# Patient Record
Sex: MALE | Race: WHITE | NOT HISPANIC OR LATINO | URBAN - METROPOLITAN AREA
[De-identification: names, ages, dates, MRNs, and addresses within clinical notes are randomized per-mention and may not be internally consistent; named-entity substitution may affect disease eponyms.]

---

## 2017-10-30 ENCOUNTER — OUTPATIENT (OUTPATIENT)
Dept: OUTPATIENT SERVICES | Facility: HOSPITAL | Age: 72
LOS: 1 days | Discharge: HOME | End: 2017-10-30

## 2017-10-30 DIAGNOSIS — C61 MALIGNANT NEOPLASM OF PROSTATE: ICD-10-CM

## 2018-04-06 ENCOUNTER — OUTPATIENT (OUTPATIENT)
Dept: OUTPATIENT SERVICES | Facility: HOSPITAL | Age: 73
LOS: 1 days | Discharge: HOME | End: 2018-04-06

## 2018-04-06 DIAGNOSIS — R94.31 ABNORMAL ELECTROCARDIOGRAM [ECG] [EKG]: ICD-10-CM

## 2021-05-06 ENCOUNTER — INPATIENT (INPATIENT)
Facility: HOSPITAL | Age: 76
LOS: 1 days | Discharge: HOME | End: 2021-05-08
Attending: STUDENT IN AN ORGANIZED HEALTH CARE EDUCATION/TRAINING PROGRAM | Admitting: STUDENT IN AN ORGANIZED HEALTH CARE EDUCATION/TRAINING PROGRAM
Payer: MEDICARE

## 2021-05-06 ENCOUNTER — TRANSCRIPTION ENCOUNTER (OUTPATIENT)
Age: 76
End: 2021-05-06

## 2021-05-06 VITALS
TEMPERATURE: 97 F | SYSTOLIC BLOOD PRESSURE: 174 MMHG | OXYGEN SATURATION: 99 % | DIASTOLIC BLOOD PRESSURE: 84 MMHG | RESPIRATION RATE: 20 BRPM | HEART RATE: 66 BPM

## 2021-05-06 LAB
ALBUMIN SERPL ELPH-MCNC: 4.6 G/DL — SIGNIFICANT CHANGE UP (ref 3.5–5.2)
ALP SERPL-CCNC: 72 U/L — SIGNIFICANT CHANGE UP (ref 30–115)
ALT FLD-CCNC: 16 U/L — SIGNIFICANT CHANGE UP (ref 0–41)
ANION GAP SERPL CALC-SCNC: 8 MMOL/L — SIGNIFICANT CHANGE UP (ref 7–14)
APPEARANCE UR: CLEAR — SIGNIFICANT CHANGE UP
APTT BLD: 33.4 SEC — SIGNIFICANT CHANGE UP (ref 27–39.2)
AST SERPL-CCNC: 15 U/L — SIGNIFICANT CHANGE UP (ref 0–41)
BASOPHILS # BLD AUTO: 0.04 K/UL — SIGNIFICANT CHANGE UP (ref 0–0.2)
BASOPHILS NFR BLD AUTO: 0.4 % — SIGNIFICANT CHANGE UP (ref 0–1)
BILIRUB SERPL-MCNC: 0.4 MG/DL — SIGNIFICANT CHANGE UP (ref 0.2–1.2)
BILIRUB UR-MCNC: NEGATIVE — SIGNIFICANT CHANGE UP
BUN SERPL-MCNC: 13 MG/DL — SIGNIFICANT CHANGE UP (ref 10–20)
CALCIUM SERPL-MCNC: 9.4 MG/DL — SIGNIFICANT CHANGE UP (ref 8.5–10.1)
CHLORIDE SERPL-SCNC: 104 MMOL/L — SIGNIFICANT CHANGE UP (ref 98–110)
CO2 SERPL-SCNC: 27 MMOL/L — SIGNIFICANT CHANGE UP (ref 17–32)
COLOR SPEC: COLORLESS — SIGNIFICANT CHANGE UP
CREAT SERPL-MCNC: 0.8 MG/DL — SIGNIFICANT CHANGE UP (ref 0.7–1.5)
DIFF PNL FLD: NEGATIVE — SIGNIFICANT CHANGE UP
EOSINOPHIL # BLD AUTO: 0.11 K/UL — SIGNIFICANT CHANGE UP (ref 0–0.7)
EOSINOPHIL NFR BLD AUTO: 1 % — SIGNIFICANT CHANGE UP (ref 0–8)
GLUCOSE SERPL-MCNC: 111 MG/DL — HIGH (ref 70–99)
GLUCOSE UR QL: NEGATIVE — SIGNIFICANT CHANGE UP
HCT VFR BLD CALC: 47.3 % — SIGNIFICANT CHANGE UP (ref 42–52)
HGB BLD-MCNC: 16.1 G/DL — SIGNIFICANT CHANGE UP (ref 14–18)
IMM GRANULOCYTES NFR BLD AUTO: 0.4 % — HIGH (ref 0.1–0.3)
INR BLD: 1.03 RATIO — SIGNIFICANT CHANGE UP (ref 0.65–1.3)
KETONES UR-MCNC: ABNORMAL
LEUKOCYTE ESTERASE UR-ACNC: NEGATIVE — SIGNIFICANT CHANGE UP
LYMPHOCYTES # BLD AUTO: 1.29 K/UL — SIGNIFICANT CHANGE UP (ref 1.2–3.4)
LYMPHOCYTES # BLD AUTO: 11.6 % — LOW (ref 20.5–51.1)
MCHC RBC-ENTMCNC: 29.2 PG — SIGNIFICANT CHANGE UP (ref 27–31)
MCHC RBC-ENTMCNC: 34 G/DL — SIGNIFICANT CHANGE UP (ref 32–37)
MCV RBC AUTO: 85.7 FL — SIGNIFICANT CHANGE UP (ref 80–94)
MONOCYTES # BLD AUTO: 0.67 K/UL — HIGH (ref 0.1–0.6)
MONOCYTES NFR BLD AUTO: 6 % — SIGNIFICANT CHANGE UP (ref 1.7–9.3)
NEUTROPHILS # BLD AUTO: 8.94 K/UL — HIGH (ref 1.4–6.5)
NEUTROPHILS NFR BLD AUTO: 80.6 % — HIGH (ref 42.2–75.2)
NITRITE UR-MCNC: NEGATIVE — SIGNIFICANT CHANGE UP
NRBC # BLD: 0 /100 WBCS — SIGNIFICANT CHANGE UP (ref 0–0)
PH UR: 7.5 — SIGNIFICANT CHANGE UP (ref 5–8)
PLATELET # BLD AUTO: 219 K/UL — SIGNIFICANT CHANGE UP (ref 130–400)
POTASSIUM SERPL-MCNC: 3.8 MMOL/L — SIGNIFICANT CHANGE UP (ref 3.5–5)
POTASSIUM SERPL-SCNC: 3.8 MMOL/L — SIGNIFICANT CHANGE UP (ref 3.5–5)
PROT SERPL-MCNC: 7.3 G/DL — SIGNIFICANT CHANGE UP (ref 6–8)
PROT UR-MCNC: NEGATIVE — SIGNIFICANT CHANGE UP
PROTHROM AB SERPL-ACNC: 11.9 SEC — SIGNIFICANT CHANGE UP (ref 9.95–12.87)
RBC # BLD: 5.52 M/UL — SIGNIFICANT CHANGE UP (ref 4.7–6.1)
RBC # FLD: 13.3 % — SIGNIFICANT CHANGE UP (ref 11.5–14.5)
SARS-COV-2 RNA SPEC QL NAA+PROBE: SIGNIFICANT CHANGE UP
SODIUM SERPL-SCNC: 139 MMOL/L — SIGNIFICANT CHANGE UP (ref 135–146)
SP GR SPEC: 1.01 — SIGNIFICANT CHANGE UP (ref 1.01–1.03)
TROPONIN T SERPL-MCNC: <0.01 NG/ML — SIGNIFICANT CHANGE UP
TROPONIN T SERPL-MCNC: <0.01 NG/ML — SIGNIFICANT CHANGE UP
UROBILINOGEN FLD QL: SIGNIFICANT CHANGE UP
WBC # BLD: 11.09 K/UL — HIGH (ref 4.8–10.8)
WBC # FLD AUTO: 11.09 K/UL — HIGH (ref 4.8–10.8)

## 2021-05-06 PROCEDURE — 70450 CT HEAD/BRAIN W/O DYE: CPT | Mod: 26,MA

## 2021-05-06 PROCEDURE — 99223 1ST HOSP IP/OBS HIGH 75: CPT

## 2021-05-06 PROCEDURE — 99283 EMERGENCY DEPT VISIT LOW MDM: CPT

## 2021-05-06 PROCEDURE — 70496 CT ANGIOGRAPHY HEAD: CPT | Mod: 26

## 2021-05-06 PROCEDURE — 93010 ELECTROCARDIOGRAM REPORT: CPT

## 2021-05-06 PROCEDURE — 99285 EMERGENCY DEPT VISIT HI MDM: CPT

## 2021-05-06 PROCEDURE — 70498 CT ANGIOGRAPHY NECK: CPT | Mod: 26

## 2021-05-06 RX ORDER — ACETAMINOPHEN 500 MG
650 TABLET ORAL EVERY 6 HOURS
Refills: 0 | Status: DISCONTINUED | OUTPATIENT
Start: 2021-05-06 | End: 2021-05-08

## 2021-05-06 RX ORDER — ASPIRIN/CALCIUM CARB/MAGNESIUM 324 MG
81 TABLET ORAL DAILY
Refills: 0 | Status: DISCONTINUED | OUTPATIENT
Start: 2021-05-06 | End: 2021-05-08

## 2021-05-06 RX ORDER — ASPIRIN/CALCIUM CARB/MAGNESIUM 324 MG
81 TABLET ORAL DAILY
Refills: 0 | Status: DISCONTINUED | OUTPATIENT
Start: 2021-05-06 | End: 2021-05-06

## 2021-05-06 RX ORDER — FINASTERIDE 5 MG/1
5 TABLET, FILM COATED ORAL DAILY
Refills: 0 | Status: DISCONTINUED | OUTPATIENT
Start: 2021-05-06 | End: 2021-05-06

## 2021-05-06 RX ORDER — AMLODIPINE BESYLATE 2.5 MG/1
10 TABLET ORAL DAILY
Refills: 0 | Status: DISCONTINUED | OUTPATIENT
Start: 2021-05-07 | End: 2021-05-08

## 2021-05-06 RX ORDER — FINASTERIDE 5 MG/1
1 TABLET, FILM COATED ORAL
Qty: 0 | Refills: 0 | DISCHARGE

## 2021-05-06 RX ORDER — AMLODIPINE BESYLATE 2.5 MG/1
1 TABLET ORAL
Qty: 0 | Refills: 0 | DISCHARGE

## 2021-05-06 RX ORDER — FINASTERIDE 5 MG/1
5 TABLET, FILM COATED ORAL DAILY
Refills: 0 | Status: DISCONTINUED | OUTPATIENT
Start: 2021-05-06 | End: 2021-05-08

## 2021-05-06 RX ORDER — ASPIRIN/CALCIUM CARB/MAGNESIUM 324 MG
1 TABLET ORAL
Qty: 0 | Refills: 0 | DISCHARGE

## 2021-05-06 RX ORDER — ENOXAPARIN SODIUM 100 MG/ML
40 INJECTION SUBCUTANEOUS DAILY
Refills: 0 | Status: DISCONTINUED | OUTPATIENT
Start: 2021-05-06 | End: 2021-05-08

## 2021-05-06 RX ORDER — ATORVASTATIN CALCIUM 80 MG/1
80 TABLET, FILM COATED ORAL AT BEDTIME
Refills: 0 | Status: DISCONTINUED | OUTPATIENT
Start: 2021-05-06 | End: 2021-05-08

## 2021-05-06 RX ORDER — CHLORHEXIDINE GLUCONATE 213 G/1000ML
1 SOLUTION TOPICAL
Refills: 0 | Status: DISCONTINUED | OUTPATIENT
Start: 2021-05-06 | End: 2021-05-08

## 2021-05-06 RX ADMIN — Medication 81 MILLIGRAM(S): at 22:54

## 2021-05-06 RX ADMIN — FINASTERIDE 5 MILLIGRAM(S): 5 TABLET, FILM COATED ORAL at 22:54

## 2021-05-06 RX ADMIN — ATORVASTATIN CALCIUM 80 MILLIGRAM(S): 80 TABLET, FILM COATED ORAL at 22:54

## 2021-05-06 RX ADMIN — Medication 650 MILLIGRAM(S): at 19:14

## 2021-05-06 NOTE — CONSULT NOTE ADULT - ATTENDING COMMENTS
I have personally seen and examined this patient.  I have fully participated in the care of this patient.  I have reviewed all pertinent clinical information, including history, physical exam, plan and note.  Patient with history of CVA and residual visual field deficit reported in the left eye presents with three days vertigo and blurred vision in the right eye. Exam shows superior hemianopia in both eyes otherwise intact strength and sensation. Recommend Stroke work up in telemetry. MRI brain w/o contrast, CTA head and neck. Continue ASA 81, Add Lipitor and check Lipid panel, TSH, A1c and TTE.   I have reviewed all pertinent clinical information and reviewed all relevant imaging and diagnostic studies personally.  Recommendations as above.  Agree with above assessment except as noted.

## 2021-05-06 NOTE — ED PROVIDER NOTE - ATTENDING CONTRIBUTION TO CARE
I personally evaluated the patient. I reviewed the Resident’s or Physician Assistant’s note (as assigned above), and agree with the findings and plan except as documented in my note.  75 year old male with a pmh of HTN & CVA s/p residual vision loss of left eye presents here c/o dizziness and mild blurry vision of right eye. Symptoms began 3 days ago. Dizziness described as room spinning sensation worse when getting up from laying down as well as associated with nausea. No fever chills headache neck pain cp sob vomiting numbness/tingling diffiuclty in speech.  On exam  CONSTITUTIONAL: WA / WN / NAD  HEAD: NCAT  EYES: PERRL; EOMI; right eye visual fields in tact.  ENT: Normal pharynx; mucous membranes pink/moist, no erythema.  NECK: Supple; no meningeal signs  CARD: RRR; nl S1/S2; no M/R/G.   RESP: Respiratory rate and effort are normal; breath sounds clear and equal bilaterally.  ABD: Soft, NT ND   MSK/EXT: No gross deformities; full range of motion.  SKIN: Warm and dry;   NEURO: AAOx3, Motor 5/5 x 4 extremities, No dysmetria no dysarthria. no pronator drift.   PSYCH: Memory Intact, Normal Affect

## 2021-05-06 NOTE — ED ADULT NURSE NOTE - CHIEF COMPLAINT QUOTE
pt BIBA from [facility] – [place], pt c/o weakness x 3 days, blurry vision in B/L eyes and dizziness all x 3 days.

## 2021-05-06 NOTE — ED PROVIDER NOTE - PHYSICAL EXAMINATION
CONSTITUTIONAL: Well-developed; well-nourished; in no acute distress.   SKIN: warm, dry  HEAD: Normocephalic; atraumatic.  EYES: no conjunctival injection. PERRL.   ENT: No nasal discharge; airway clear. TM's normal  NECK: Supple; non tender.  CARD: S1, S2 normal; no murmurs, gallops, or rubs. Regular rate and rhythm.   RESP: No wheezes, rales or rhonchi.  ABD: soft ntnd  EXT: Normal ROM.  No clubbing, cyanosis or edema.   LYMPH: No acute cervical adenopathy.  PSYCH: Cooperative, appropriate.

## 2021-05-06 NOTE — ED PROVIDER NOTE - CRANIAL NERVE AND PUPILLARY EXAM
no nystagmus. Left eye central/peripheral vision not intact. Peripheral/central vision intact in R eye 20/70/cranial nerves 2-12 intact

## 2021-05-06 NOTE — ED PROVIDER NOTE - NS ED ROS FT
Eyes: +visual changes  ENMT:  +hearing changes  Cardiac:  No chest pain, SOB or edema. No chest pain with exertion.  Respiratory:  No cough or respiratory distress. No hemoptysis. No history of asthma or RAD.  GI:  No nausea, vomiting, diarrhea or abdominal pain.  :  No dysuria, frequency or burning.  MS:  No myalgia, muscle weakness, joint pain or back pain.  Neuro:  see HPI  Skin:  No skin rash.   Endocrine: No history of thyroid disease or diabetes.

## 2021-05-06 NOTE — ED ADULT TRIAGE NOTE - CHIEF COMPLAINT QUOTE
[place] [facility] [place]/o weakness x 3 days, blurry vision in B/L eyes and dizziness all x 3 days.

## 2021-05-06 NOTE — H&P ADULT - NSHPLABSRESULTS_GEN_ALL_CORE
VITALS:   T(F): 96.2  HR: 68  BP: 189/91  RR: 18  SpO2: 98%    LABS:                        16.1   11.09 )-----------( 219      ( 06 May 2021 13:30 )             47.3         139  |  104  |  13  ----------------------------<  111<H>  3.8   |  27  |  0.8    Ca    9.4      06 May 2021 13:30    TPro  7.3  /  Alb  4.6  /  TBili  0.4  /  DBili  x   /  AST  15  /  ALT  16  /  AlkPhos  72      PT/INR - ( 06 May 2021 13:30 )   PT: 11.90 sec;   INR: 1.03 ratio         PTT - ( 06 May 2021 13:30 )  PTT:33.4 sec  Urinalysis Basic - ( 06 May 2021 15:45 )    Color: Colorless / Appearance: Clear / S.010 / pH: x  Gluc: x / Ketone: Small  / Bili: Negative / Urobili: <2 mg/dL   Blood: x / Protein: Negative / Nitrite: Negative   Leuk Esterase: Negative / RBC: x / WBC x   Sq Epi: x / Non Sq Epi: x / Bacteria: x        Troponin T, Serum: <0.01 ng/mL (21 @ 13:30)      CARDIAC MARKERS ( 06 May 2021 13:30 )  x     / <0.01 ng/mL / x     / x     / x

## 2021-05-06 NOTE — ED PROVIDER NOTE - CLINICAL SUMMARY MEDICAL DECISION MAKING FREE TEXT BOX
75 year old male with a pmh of HTN & CVA s/p residual vision loss of left eye presents here c/o dizziness and mild blurry vision of right eye. Symptoms began 3 days ago. Dizziness described as room spinning sensation worse when getting up from laying down as well as associated with nausea. No fever chills headache neck pain cp sob vomiting numbness/tingling diffiuclty in speech. Vs reviewed. Labs imaging obtained and reviewed. Neurology consulted and recommended admission for MRI.

## 2021-05-06 NOTE — H&P ADULT - HISTORY OF PRESENT ILLNESS
75y M pmh HTN, BPH, CVA with residual Left eye vision loss presenting with intermittent dizziness for the past three days. Patient had a prior CVA and the symptoms are similar. He endorses dizziness for the past three days associated with nausea. He endorses lightheaded dizzy sensation when he stands up to fast. He has had vision changes as well, decreased ability to see distance. He has a frontal headache, non radiating, not relieved or exacerbated by anything. Symptoms have progressed over the previous three days so he came in. He has had decreased PO intake. Denies dysarthria, aphasia, or weakness. Denies CP, SOB, abdominal pain, or changes in urination/ bowel movements.   In ED Vitals: Temp 97, HR 66, / 86, RR 20 SpO2 100%. EKG: NSR, RBB. No previous EKG to compare to, no history of CAD. CT: No acute intracranial pathology. Mild chronic microvascular ischemic changes. Troponin negative. Patient being admitted for CVA Vs BPV.

## 2021-05-06 NOTE — CONSULT NOTE ADULT - SUBJECTIVE AND OBJECTIVE BOX
Neurology Consult    Patient is a 75y old  Male who presents with a chief complaint of     HPI:      PAST MEDICAL & SURGICAL HISTORY:  HTN (hypertension)        FAMILY HISTORY:      Social History: (-) x 3    Allergies    No Known Allergies    Intolerances        MEDICATIONS  (STANDING):    MEDICATIONS  (PRN):      Review of systems:    Constitutional: as per HPI  Eyes: +R eye blurriness  ENMT:  No difficulty hearing; No sinus or throat pain  Neck: No pain or stiffness  Respiratory: No cough, wheezing, chills or hemoptysis  Cardiovascular: No chest pain, palpitations, shortness of breath, dyspnea on exertion  Gastrointestinal: No abdominal pain, nausea, vomiting or hematemesis; No diarrhea or constipation.   Genitourinary: No dysuria, frequency, hematuria or incontinence  Neurological: +dizziness, Right vision blurriness  Skin: No rashes or lesions   Endocrine: No heat or cold intolerance; No hair loss  Musculoskeletal: No joint pain or swelling  Psychiatric: No depression, anxiety, mood swings  Heme/Lymph: No easy bruising or bleeding gums    Vital Signs Last 24 Hrs  T(C): 36.1 (06 May 2021 12:16), Max: 36.1 (06 May 2021 12:16)  T(F): 97 (06 May 2021 12:16), Max: 97 (06 May 2021 12:16)  HR: 66 (06 May 2021 12:16) (66 - 66)  BP: 174/84 (06 May 2021 12:16) (174/84 - 174/84)  BP(mean): --  RR: 20 (06 May 2021 12:16) (20 - 20)  SpO2: 99% (06 May 2021 12:16) (99% - 99%)    Examination:  General:  Appearance is consistent with chronologic age.  No abnormal facies.  Gross skin survey within normal limits.    Cognitive/Language:  The patient is oriented to person, place, time and date.  Recent and remote memory intact.  Fund of knowledge is intact and normal.  Language with normal repetition, comprehension and naming.  Nondysarthric.    Eyes: +R eye with  vision acuity, +Left eye peripheral and central vision not intact.   EOMI w/o nystagmus, skew or reported double vision.  PERRL.  No ptosis/weakness of eyelid closure.    Face:  Facial sensation normal V1 - 3, no facial asymmetry.    Ears/Nose/Throat:  Hearing grossly intact b/l.  Palate elevates midline.  Tongue and uvula midline.   Motor examination:   Normal tone, bulk and range of motion.  No tenderness, twitching, tremors or involuntary movements.  Formal Muscle Strength Testing: (MRC grade R/L) 5/5 UE; 5/5 LE.  No observable drift.  Reflexes:   2+ b/l pectoralis, biceps, triceps, brachioradialis, patella and Achilles.  Plantar response downgoing b/l.  Jaw jerk, Segundo, clonus absent.  Sensory examination:   Intact to light touch and pinprick, pain, temperature and proprioception and vibration in all extremities.  Cerebellum:   FTN/HKS intact with normal JACQUES in all limbs.  No dysmetria or dysdiadokinesia.  Gait narrow based and normal.    Respiratory:  no audible wheezing or inspiratory stridor.  no use of accessory muscles.   Cardiac: pulse palpable, no audible bruits  Abdomen: supple, no guarding, no TTP    Labs:                     [facility]     [facility] @ 13:25       Neurology Consult    Patient is a 75y old  Male who presents with a chief complaint of dizziness and blurred vision.       HPI:  75y M pmh HTN, CVA with residual Left eye visual field deficit currently on ASA 81  presenting with intermittent dizziness x3 days. Acute onset. No trauma/falls. No headache. Worse with head movements. Associated with nausea. Room spinning sensation. Reports blurred vision in the right eye. Denies weakness or numbness     PAST MEDICAL & SURGICAL HISTORY:  HTN (hypertension)  CVA       FAMILY HISTORY:      Social History: (-) x 3    Allergies    No Known Allergies    Intolerances        MEDICATIONS  (STANDING):    MEDICATIONS  (PRN):      Review of systems:    Constitutional: as per HPI  Eyes: +R eye blurriness  ENMT:  No difficulty hearing; No sinus or throat pain  Neck: No pain or stiffness  Respiratory: No cough, wheezing, chills or hemoptysis  Cardiovascular: No chest pain, palpitations, shortness of breath, dyspnea on exertion  Gastrointestinal: No abdominal pain, nausea, vomiting or hematemesis; No diarrhea or constipation.   Genitourinary: No dysuria, frequency, hematuria or incontinence  Neurological: +dizziness, Right vision blurriness  Skin: No rashes or lesions   Endocrine: No heat or cold intolerance; No hair loss  Musculoskeletal: No joint pain or swelling  Psychiatric: No depression, anxiety, mood swings  Heme/Lymph: No easy bruising or bleeding gums    Vital Signs Last 24 Hrs  T(C): 36.1 (06 May 2021 12:16), Max: 36.1 (06 May 2021 12:16)  T(F): 97 (06 May 2021 12:16), Max: 97 (06 May 2021 12:16)  HR: 66 (06 May 2021 12:16) (66 - 66)  BP: 174/84 (06 May 2021 12:16) (174/84 - 174/84)  BP(mean): --  RR: 20 (06 May 2021 12:16) (20 - 20)  SpO2: 99% (06 May 2021 12:16) (99% - 99%)    Examination:  General:  Appearance is consistent with chronologic age.  No abnormal facies.  Gross skin survey within normal limits.    Cognitive/Language:  The patient is oriented to person, place, time and date.  Recent and remote memory intact.  Fund of knowledge is intact and normal.  Language with normal repetition, comprehension and naming.  Nondysarthric.    Eyes: +R eye with  vision acuity, Bilateral superior quadrant hemianopia.  EOMI w/o nystagmus, skew or reported double vision.  PERRL.  No ptosis/weakness of eyelid closure.    Face:  Facial sensation normal V1 - 3, no facial asymmetry.    Ears/Nose/Throat:  Hearing grossly intact b/l.  Palate elevates midline.  Tongue and uvula midline.   Motor examination:   Normal tone, bulk and range of motion.  No tenderness, twitching, tremors or involuntary movements.  Formal Muscle Strength Testing: (MRC grade R/L) 5/5 UE; 5/5 LE.  No observable drift.  Reflexes:   2+ b/l pectoralis, biceps, triceps, brachioradialis, patella and Achilles.  Plantar response downgoing b/l.  Jaw jerk, Segundo, clonus absent.  Sensory examination:   Intact to light touch and pinprick, pain, temperature and proprioception and vibration in all extremities.  Cerebellum:   FTN/HKS intact with normal JACQUES in all limbs.  No dysmetria or dysdiadokinesia.  Gait narrow based and normal.    Respiratory:  no audible wheezing or inspiratory stridor.  no use of accessory muscles.   Cardiac: pulse palpable, no audible bruits  Abdomen: supple, no guarding, no TTP    Labs:           Neuroimaging:  [facility]:     21 @ 13:25

## 2021-05-06 NOTE — H&P ADULT - NSHPPHYSICALEXAM_GEN_ALL_CORE
PHYSICAL EXAM:  GENERAL: NAD, speaks in full sentences, no signs of respiratory distress  HEAD: Atraumatic. Decreased Vision in right eye. Bilateral superior quadrant hemianopia.   NECK: Supple  CHEST/LUNG: Clear to auscultation bilaterally; No wheeze or crackles  HEART: S1, S2; RRR; No murmurs, rubs, or gallops  ABDOMEN: BS+; Soft, Non-tender, Non-distended  EXTREMITIES:  2+ Peripheral Pulses, No clubbing, cyanosis, or edema  PSYCH: AAOx3  NEUROLOGY: non-focal  SKIN: No rashes or lesions

## 2021-05-06 NOTE — ED PROVIDER NOTE - OBJECTIVE STATEMENT
75y M pmh HTN, CVA with residual Left eye vision loss presenting with intermittent dizziness x3 days. Acute onset. No trauma/falls. No headache. Worse with head movements. Associated with nausea. Room spinning sensation. No chest pain/SOB. +right eye blurry vision. No f/c. +hissing noises to b/l ears when he watches tv sometimes. Ambulatory

## 2021-05-06 NOTE — ED ADULT NURSE NOTE - OBJECTIVE STATEMENT
Pt BIBA from [facility] – [place], [place], pt c/o weakness x 3 days, blurry vision in B/L eyes and dizziness all x 3 days.

## 2021-05-06 NOTE — H&P ADULT - ASSESSMENT
75y M pmh HTN, BPH, CVA with residual Left eye vision loss presenting with intermittent dizziness for the past three days.    DDx: new CVA vs. BPPV    Recommendations:  - f/u CTH, CTA Head and Neck  - MRI head without contrast  - Admission for telemetry   - Continue ASA and Give Lipitor 80  - TTE   - Neurology will follow   75y M pmh HTN, BPH, CVA with residual Left eye vision loss presenting with intermittent dizziness for the past three days.    #CVA Vs BPV Vs. HTN urgency   #Previous CVA with residual left eye vision loss   - CT: No acute intracranial pathology. Mild chronic microvascular ischemic changes.  - CTA head and neck  - MR Brain non con   - ECHO  - Atorvastatin 80mg   - c/w aspirin 81   - f/u neuro     #Right Bundle Branch block   - EKG: NSR, RBB  - no history of CAD  - tele monitoring   - troponins negative x1, f/u repeat  - cardio OP     #HTN  - permissive HTN  - c/w amlodipine    #BPH  - c/w finesteride   - f/u PSA    #DVT PPX: Lovenox 40mg  #GI PPX: not indicated  #Diet: DASH  #Dispo: acute

## 2021-05-06 NOTE — H&P ADULT - ATTENDING COMMENTS
75y M pmh HTN, BPH, CVA with residual Left eye vision loss presenting with intermittent dizziness for the past three days. Patient had a prior CVA and the symptoms are similar.  Also endorsing dizziness and nausea. Symptoms worse with suddenly standing. Also endorsing worsening vision, and frontal headache. Symptoms progressing over three days so decided to come to ED. Afebrile, normocardic, saturating well on RA, but systolic BPs 170-180s.  EKG: NSR, RBB, LAFB. CTH w/no acute intracranial pathology, Mild chronic microvascular ischemic changes. Troponin negative, labs otherwise unremarkable. Patient being admitted for CVA Vs BPPV.     Physical Exam:  GENERAL: NAD, speaks in full sentences, no signs of respiratory distress  HEAD: Atraumatic. Decreased Vision in right eye. Bilateral superior quadrant hemianopia.   NECK: Supple  CHEST/LUNG: Clear to auscultation bilaterally; No wheeze or crackles  HEART: S1, S2; RRR; No murmurs, rubs, or gallops  ABDOMEN: BS+; Soft, Non-tender, Non-distended  EXTREMITIES:  2+ Peripheral Pulses, No clubbing, cyanosis, or edema  PSYCH: AAOx3  NEUROLOGY: non-focal  SKIN: No rashes or lesions      Assessment and Plan:    #Intermittent Dizziness  #Previous CVA with residual left eye vision loss   - ddx CVA vs BPPV   - CT: No acute intracranial pathology. Mild chronic microvascular ischemic changes.  - CTA head and neck pending  - MR Brain non con pending  - TTE pending  - c/w ASA statin   - Neuro following     #Right Bundle Branch block   - EKG: NSR, RBBB  - tele monitoring   - troponins negative x1, f/u repeat    #HTN- c/w amlodipine  #BPH- c/w finesteride     #DVT PPX: Lovenox 40mg    #Progress Note Handoff:  Pending (specify): CTA H/N and MRI   Family discussion: d/w patient   Disposition: Home___/SNF___/Other________/Unknown at this time___x_____      Tory Darden,  75y M pmh HTN, BPH, CVA with residual Left eye vision loss presenting with intermittent dizziness for the past three days. Patient had a prior CVA and the symptoms are similar.  Also endorsing dizziness and nausea. Symptoms worse with suddenly standing. Also endorsing worsening vision, and frontal headache. Symptoms progressing over three days so decided to come to ED. Afebrile, normocardic, saturating well on RA, but systolic BPs 170-180s.  EKG: NSR, RBB, LAFB. CTH w/no acute intracranial pathology, Mild chronic microvascular ischemic changes. Troponin negative, labs otherwise unremarkable. Patient being admitted for CVA Vs BPPV.     Physical Exam:  GENERAL: NAD, speaks in full sentences, no signs of respiratory distress  HEAD: Atraumatic. Decreased Vision in right eye.   NECK: Supple  CHEST/LUNG: Clear to auscultation bilaterally; No wheeze or crackles  HEART: S1, S2; RRR; No murmurs, rubs, or gallops  ABDOMEN: BS+; Soft, Non-tender, Non-distended  EXTREMITIES:  2+ Peripheral Pulses, No clubbing, cyanosis, or edema  NEUROLOGY: non-focal, equal strength throughout, normal sensation      Assessment and Plan:    #Intermittent Dizziness  #Previous CVA with residual left eye vision loss   - ddx CVA vs BPPV   - CT: No acute intracranial pathology. Mild chronic microvascular ischemic changes.  - CTA head and neck pending  - MR Brain non con pending  - TTE pending  - c/w ASA statin , lipid panel pending  - Neuro following     #Right Bundle Branch block   - EKG: NSR, RBBB  - tele monitoring   - troponins negative x1, f/u repeat    #HTN- c/w amlodipine  #BPH- c/w finesteride     #DVT PPX: Lovenox 40mg    #Progress Note Handoff:  Pending (specify): CTA H/N and MRI   Family discussion: d/w patient and family at bedside   Disposition: Home___/SNF___/Other________/Unknown at this time___x_____      Tory Darden,

## 2021-05-06 NOTE — ED ADULT NURSE NOTE - NSIMPLEMENTINTERV_GEN_ALL_ED
Implemented All Fall with Harm Risk Interventions:  [place] to call system. Call bell, personal items and telephone within reach. Instruct patient to call for assistance. Room bathroom lighting operational. Non-slip footwear when patient is off stretcher. Physically safe environment: no spills, clutter or unnecessary equipment. Stretcher in lowest position, wheels locked, appropriate side rails in place. Provide visual cue, wrist band, yellow gown, etc. Monitor gait and stability. Monitor for mental status changes and reorient to person, place, and time. Review medications for side effects contributing to fall risk. Reinforce activity limits and safety measures with patient and family. Provide visual clues: red socks.

## 2021-05-06 NOTE — CONSULT NOTE ADULT - ASSESSMENT
75y M pmh HTN, CVA with residual Left eye vision loss presenting with intermittent dizziness x3 days. Acute onset. No trauma/falls. No headache. Worse with head movements. Associated with nausea. Room spinning sensation. No chest pain/SOB. +right eye blurry vision. No f/c. +hissing noises to b/l ears when he watches tv sometimes. Ambulatory.      75y M pmh HTN, CVA with residual Left eye vision loss presenting with intermittent dizziness x3 days. Acute onset. No trauma/falls. No headache. Worse with head movements. Associated with nausea. Room spinning sensation. No chest pain/SOB. +right eye blurry vision. No f/c. +hissing noises to b/l ears when he watches tv sometimes. Ambulatory.  Vision changes may be a combination of residual effects of old stroke and possible new stroke.    DDx: new CVA vs. BPPV    Recommendations:  - f/u CTH, CTA Head and Neck  - MRI head without contrast   75y M pmh HTN, CVA with residual Left eye vision loss presenting with intermittent dizziness x3 days. Acute onset. No trauma/falls. No headache. Worse with head movements. Associated with nausea. Room spinning sensation. No chest pain/SOB. +right eye blurry vision. No f/c. +hissing noises to b/l ears when he watches tv sometimes. Ambulatory.  Vision changes may be a combination of residual effects of old stroke and possible new stroke.    DDx: new CVA vs. BPPV    Recommendations:  - f/u CTH, CTA Head and Neck  - MRI head without contrast  - Admission for telemetry   - Continue ASA and Give Lipitor 80  - TTE   - Neurology will follow

## 2021-05-07 LAB
A1C WITH ESTIMATED AVERAGE GLUCOSE RESULT: 6 % — HIGH (ref 4–5.6)
ALBUMIN SERPL ELPH-MCNC: 4.1 G/DL — SIGNIFICANT CHANGE UP (ref 3.5–5.2)
ALP SERPL-CCNC: 69 U/L — SIGNIFICANT CHANGE UP (ref 30–115)
ALT FLD-CCNC: 13 U/L — SIGNIFICANT CHANGE UP (ref 0–41)
ANION GAP SERPL CALC-SCNC: 8 MMOL/L — SIGNIFICANT CHANGE UP (ref 7–14)
AST SERPL-CCNC: 12 U/L — SIGNIFICANT CHANGE UP (ref 0–41)
BILIRUB SERPL-MCNC: 0.6 MG/DL — SIGNIFICANT CHANGE UP (ref 0.2–1.2)
BUN SERPL-MCNC: 12 MG/DL — SIGNIFICANT CHANGE UP (ref 10–20)
CALCIUM SERPL-MCNC: 9 MG/DL — SIGNIFICANT CHANGE UP (ref 8.5–10.1)
CHLORIDE SERPL-SCNC: 104 MMOL/L — SIGNIFICANT CHANGE UP (ref 98–110)
CHOLEST SERPL-MCNC: 177 MG/DL — SIGNIFICANT CHANGE UP
CO2 SERPL-SCNC: 29 MMOL/L — SIGNIFICANT CHANGE UP (ref 17–32)
CREAT SERPL-MCNC: 1 MG/DL — SIGNIFICANT CHANGE UP (ref 0.7–1.5)
ESTIMATED AVERAGE GLUCOSE: 126 MG/DL — HIGH (ref 68–114)
GLUCOSE SERPL-MCNC: 106 MG/DL — HIGH (ref 70–99)
HCT VFR BLD CALC: 46.8 % — SIGNIFICANT CHANGE UP (ref 42–52)
HDLC SERPL-MCNC: 63 MG/DL — SIGNIFICANT CHANGE UP
HGB BLD-MCNC: 15.1 G/DL — SIGNIFICANT CHANGE UP (ref 14–18)
LIPID PNL WITH DIRECT LDL SERPL: 109 MG/DL — HIGH
MAGNESIUM SERPL-MCNC: 2.1 MG/DL — SIGNIFICANT CHANGE UP (ref 1.8–2.4)
MCHC RBC-ENTMCNC: 28.1 PG — SIGNIFICANT CHANGE UP (ref 27–31)
MCHC RBC-ENTMCNC: 32.3 G/DL — SIGNIFICANT CHANGE UP (ref 32–37)
MCV RBC AUTO: 87.2 FL — SIGNIFICANT CHANGE UP (ref 80–94)
NON HDL CHOLESTEROL: 114 MG/DL — SIGNIFICANT CHANGE UP
NRBC # BLD: 0 /100 WBCS — SIGNIFICANT CHANGE UP (ref 0–0)
PLATELET # BLD AUTO: 209 K/UL — SIGNIFICANT CHANGE UP (ref 130–400)
POTASSIUM SERPL-MCNC: 4 MMOL/L — SIGNIFICANT CHANGE UP (ref 3.5–5)
POTASSIUM SERPL-SCNC: 4 MMOL/L — SIGNIFICANT CHANGE UP (ref 3.5–5)
PROT SERPL-MCNC: 6.5 G/DL — SIGNIFICANT CHANGE UP (ref 6–8)
RBC # BLD: 5.37 M/UL — SIGNIFICANT CHANGE UP (ref 4.7–6.1)
RBC # FLD: 13.3 % — SIGNIFICANT CHANGE UP (ref 11.5–14.5)
SODIUM SERPL-SCNC: 141 MMOL/L — SIGNIFICANT CHANGE UP (ref 135–146)
TRIGL SERPL-MCNC: 87 MG/DL — SIGNIFICANT CHANGE UP
TROPONIN T SERPL-MCNC: <0.01 NG/ML — SIGNIFICANT CHANGE UP
WBC # BLD: 8.38 K/UL — SIGNIFICANT CHANGE UP (ref 4.8–10.8)
WBC # FLD AUTO: 8.38 K/UL — SIGNIFICANT CHANGE UP (ref 4.8–10.8)

## 2021-05-07 PROCEDURE — 99222 1ST HOSP IP/OBS MODERATE 55: CPT

## 2021-05-07 PROCEDURE — 70551 MRI BRAIN STEM W/O DYE: CPT | Mod: 26

## 2021-05-07 PROCEDURE — 99233 SBSQ HOSP IP/OBS HIGH 50: CPT

## 2021-05-07 RX ADMIN — ENOXAPARIN SODIUM 40 MILLIGRAM(S): 100 INJECTION SUBCUTANEOUS at 11:19

## 2021-05-07 RX ADMIN — FINASTERIDE 5 MILLIGRAM(S): 5 TABLET, FILM COATED ORAL at 11:19

## 2021-05-07 RX ADMIN — Medication 81 MILLIGRAM(S): at 11:19

## 2021-05-07 RX ADMIN — AMLODIPINE BESYLATE 10 MILLIGRAM(S): 2.5 TABLET ORAL at 05:25

## 2021-05-07 RX ADMIN — ATORVASTATIN CALCIUM 80 MILLIGRAM(S): 80 TABLET, FILM COATED ORAL at 23:16

## 2021-05-07 RX ADMIN — Medication 1 MILLIGRAM(S): at 15:59

## 2021-05-07 NOTE — PHYSICAL THERAPY INITIAL EVALUATION ADULT - GENERAL OBSERVATIONS, REHAB EVAL
10:11-10:38 Pt encountered semifowler in bed in NAD. Orthostatic BP: supine: 161/77, HR 67 bpm, Seated: 154/68, HR 77 bpm, standing : 149/70 HR 75. Pt c/o of mild dizziness throughout tx, but did not change with position. 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

## 2021-05-07 NOTE — PROGRESS NOTE ADULT - SUBJECTIVE AND OBJECTIVE BOX
Patient is a 75y old  Male who presents with a chief complaint of   INTERVAL HPI/OVERNIGHT EVENTS: Patient was examined and seen at bedside. This morning he is resting comfortably in bed and reports no new issues or overnight events. No complaints, feels better. Still occasionally lightheaded. Reports both room spinning (yesterday) and just feeling slightly dizzy (today). Able to ambulate. Also reports new decreased vision on the Rt. Spouse is upset with the slow pace of testing vs a hospital in [place].  ROS: Denies CP, SOB, AP, new weakness  All other systems reviewed and are within normal limits.  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 Patient had a prior CVA and the symptoms are similar. He endorses dizziness for the past three days associated with nausea. He endorses lightheaded dizzy sensation when he stands up to fast. He has had vision changes as well, decreased ability to see distance. He has a frontal headache, non radiating, not relieved or exacerbated by anything. Symptoms have progressed over the previous three days so he came in. He has had decreased PO intake. Denies dysarthria, aphasia, or weakness. Denies CP, SOB, abdominal pain, or changes in urination/ bowel movements.   In ED Vitals: Temp 97, HR 66, / 86, RR 20 SpO2 100%. EKG: NSR, RBB. No previous EKG to compare to, no history of CAD. CT: No acute intracranial pathology. Mild chronic microvascular ischemic changes. Troponin negative. Patient being admitted for CVA Vs BPV.      (06 May 2021 16:40)    PAST MEDICAL & SURGICAL HISTORY:  HTN (hypertension)    History of BPH    No significant past surgical history        General: NAD, AAO3  HEENT:  EOMI, no LAD, Lt field cut, RUQ homonymous quadranopia  CV: S1 S2  Resp: decreased breath sounds at bases  GI: NT/ND/S +BS  MS: no clubbing/cyanosis/edema, + pulses b/l  Neuro: nonfocal, +reflexes thruout, no abnormal cerebellar Sx, -Rhomberg    MEDICATIONS  (STANDING):  amLODIPine   Tablet 10 milliGRAM(s) Oral daily  aspirin enteric coated 81 milliGRAM(s) Oral daily  atorvastatin 80 milliGRAM(s) Oral at bedtime  chlorhexidine 4% Liquid 1 Application(s) Topical <User Schedule>  enoxaparin Injectable 40 milliGRAM(s) SubCutaneous daily  finasteride 5 milliGRAM(s) Oral daily    MEDICATIONS  (PRN):  acetaminophen   Tablet .. 650 milliGRAM(s) Oral every 6 hours PRN Mild Pain (1 - 3)  LORazepam   Injectable 1 milliGRAM(s) IV Push once PRN MRI  LORazepam   Injectable 0.5 milliGRAM(s) IV Push once PRN Anxiety    Vital Signs Last 24 Hrs  T(C): 36.4 (07 May 2021 14:35), Max: 36.4 (07 May 2021 14:35)  T(F): 97.6 (07 May 2021 14:35), Max: 97.6 (07 May 2021 14:35)  HR: 71 (07 May 2021 14:35) (63 - 76)  BP: 144/78 (07 May 2021 14:35) (140/76 - 189/91)  BP(mean): 103 (07 May 2021 04:36) (103 - 103)  RR: 18 (07 May 2021 04:36) (18 - 18)  SpO2: 98% (06 May 2021 19:34) (98% - 98%)  CAPILLARY BLOOD GLUCOSE                              15.1   8.38  )-----------( 209      ( 07 May 2021 05:56 )             46.8     05-07    141  |  104  |  12  ----------------------------<  106<H>  4.0   |  29  |  1.0    Ca    9.0      07 May 2021 05:56  Mg     2.1     05-07    TPro  6.5  /  Alb  4.1  /  TBili  0.6  /  DBili  x   /  AST  12  /  ALT  13  /  AlkPhos  69  05-07    LIVER FUNCTIONS - ( 07 May 2021 05:56 )  Alb: 4.1 g/dL / Pro: 6.5 g/dL / ALK PHOS: 69 U/L / ALT: 13 U/L / AST: 12 U/L / GGT: x           CARDIAC MARKERS ( 07 May 2021 05:56 )  x     / <0.01 ng/mL / x     / x     / x      CARDIAC MARKERS ( 06 May 2021 20:00 )  x     / <0.01 ng/mL / x     / x     / x      CARDIAC MARKERS ( 06 May 2021 13:30 )  x     / <0.01 ng/mL / x     / x     / x          PT/INR - ( 06 May 2021 13:30 )   PT: 11.90 sec;   INR: 1.03 ratio         PTT - ( 06 May 2021 13:30 )  PTT:33.4 sec  Urinalysis Basic - ( 06 May 2021 15:45 )    Color: Colorless / Appearance: Clear / S.010 / pH: x  Gluc: x / Ketone: Small  / Bili: Negative / Urobili: <2 mg/dL   Blood: x / Protein: Negative / Nitrite: Negative   Leuk Esterase: Negative / RBC: x / WBC x   Sq Epi: x / Non Sq Epi: x / Bacteria: x              Chart, Consultant(s) Notes Reviewed:  [x ] YES  [ ] NO  Care Discussed with Consultants/Other Providers/ Housestaff [ x] YES  [ ] NO  Radiology, labs, old available records personally reviewed.

## 2021-05-07 NOTE — CONSULT NOTE ADULT - SUBJECTIVE AND OBJECTIVE BOX
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 Patient had a prior CVA and the symptoms are similar. He endorses dizziness for the past three days associated with nausea. He endorses lightheaded dizzy sensation when he stands up to fast. He has had vision changes as well, decreased ability to see distance. He has a frontal headache, non radiating, not relieved or exacerbated by anything. Symptoms have progressed over the previous three days so he came in. He has had decreased PO intake. Denies dysarthria, aphasia, or weakness. Denies CP, SOB, abdominal pain, or changes in urination/ bowel movements.   In ED Vitals: Temp 97, HR 66, / 86, RR 20 SpO2 100%. EKG: NSR, RBB. No previous EKG to compare to, no history of CAD. CT: No acute intracranial pathology. Mild chronic microvascular ischemic changes. Troponin negative. Patient being admitted for CVA Vs BPV.      (06 May 2021 16:40)      Extended HPI:  (-)burning, itching, redness, tearing OD/OS  (-)flashes, floaters, eye pain OD/OS    PAST MEDICAL & SURGICAL HISTORY:  HTN (hypertension)    History of BPH    No significant past surgical history      MEDICATIONS  (STANDING):  amLODIPine   Tablet 10 milliGRAM(s) Oral daily  aspirin enteric coated 81 milliGRAM(s) Oral daily  atorvastatin 80 milliGRAM(s) Oral at bedtime  chlorhexidine 4% Liquid 1 Application(s) Topical <User Schedule>  enoxaparin Injectable 40 milliGRAM(s) SubCutaneous daily  finasteride 5 milliGRAM(s) Oral daily    MEDICATIONS  (PRN):  acetaminophen   Tablet .. 650 milliGRAM(s) Oral every 6 hours PRN Mild Pain (1 - 3)  LORazepam   Injectable 0.5 milliGRAM(s) IV Push once PRN Anxiety    Allergies    No Known Allergies    Intolerances        POHx:  JENA:   (-)injuries/surgeries    Ocular Medications: none    FOHx: Non-contributory    VISUAL ACUITY  OD: 20/20 sc/cc PH: 20/  OS FC/1F    NEURO TESTING  Pupils: 	PERRL, +APD OS  EOMS: 	FULL OD/OS  CVF: 	Full to red light OD/OS    ANTERIOR SEGMENT EVALUATION:   lids/lashes: 	clear OD/OS  conjunctiva: 	clear OD/OS  cornea: 	clear OD/OS  anterior chamber: deep & quiet OD/OS  iris: 	flat and even OD/OS    INTRAOCULAR PRESSURE  Goldmann Applanation Tonometry/Tonopen  OD:11 mmHg  OS:11 mmHg  @    POSTERIOR SEGMENT EVALUATION  Dilated: Tropicamide 1%, Phenylephrine 2.5% OD/OS    Lens: 	             CAT OS more than OD  Vitreous: 	clear OD/OS  Optic nerve:	distinct, pink and healthy OD/ Optic atrophy OS  Macula: 	flat, even OD/OS  Vessels: 	2:3 OD/OS  Periphery: 	flat, (-)holes/breaks/tears 360 OD/OS

## 2021-05-07 NOTE — CONSULT NOTE ADULT - ASSESSMENT
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

## 2021-05-07 NOTE — OCCUPATIONAL THERAPY INITIAL EVALUATION ADULT - SPECIFY REASON(S)
OT attempt evaluation this PM. Pt's chart reviewed. Pt unavailable, off unit at eye clinic. Will follow up when appropriate.

## 2021-05-07 NOTE — PHYSICAL THERAPY INITIAL EVALUATION ADULT - PERTINENT HX OF CURRENT PROBLEM, REHAB EVAL
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 Patient had a prior CVA and the symptoms are similar. He endorses dizziness for the past three days associated with nausea. He endorses lightheaded dizzy sensation when he stands up to fast. He has had vision changes as well, decreased ability to see distance.

## 2021-05-07 NOTE — PROGRESS NOTE ADULT - SUBJECTIVE AND OBJECTIVE BOX
SUBJECTIVE:    Patient is a 75y old Male who presents with a chief complaint of   Currently admitted to medicine with the primary diagnosis of Stroke-like symptoms       Today is hospital day 1d. This morning he is resting comfortably in bed and reports no new issues or overnight events.     The patient notes decreased vision in right eye. Reports dizziness when he stands up too fast but notes this is unusual for him because he hydrates himself well and drinks a lot of water. The patient notes he is very active an exercises daily.     He is adamant about staying in the hospital for the work up and was not sure why he needed all the tests which are ordered. I went over the necessity of the TTE  and MRI as well as lab work in detail and spent at least 30 minutes with the patient and the wife at bedside. The patient was agreeable to stay at this time and expressed full understanding of his current medical condition and the results of CTA H/N.      ROS:   CONSTITUTIONAL: No weakness, fevers or chills , + dizziness   EYES/ENT: DECREASED VISION RIGHT SIDE   NECK: No pain or stiffness   RESPIRATORY: No cough, wheezing, hemoptysis; No shortness of breath   CARDIOVASCULAR: No chest pain or palpitations   GASTROINTESTINAL: No abdominal or epigastric pain. No nausea, vomiting, or hematemesis; No diarrhea or constipation. No melena or hematochezia.  GENITOURINARY: No dysuria, frequency or hematuria  NEUROLOGICAL: No numbness or weakness  SKIN: No itching, rashes      PAST MEDICAL & SURGICAL HISTORY  HTN (hypertension)    History of BPH    No significant past surgical history      SOCIAL HISTORY:    ALLERGIES:  No Known Allergies    MEDICATIONS:  STANDING MEDICATIONS  amLODIPine   Tablet 10 milliGRAM(s) Oral daily  aspirin enteric coated 81 milliGRAM(s) Oral daily  atorvastatin 80 milliGRAM(s) Oral at bedtime  chlorhexidine 4% Liquid 1 Application(s) Topical <User Schedule>  enoxaparin Injectable 40 milliGRAM(s) SubCutaneous daily  finasteride 5 milliGRAM(s) Oral daily    PRN MEDICATIONS  acetaminophen   Tablet .. 650 milliGRAM(s) Oral every 6 hours PRN  LORazepam   Injectable 1 milliGRAM(s) IV Push once PRN  LORazepam   Injectable 0.5 milliGRAM(s) IV Push once PRN    VITALS:   T(F): 97.2  HR: 63  BP: 140/76  RR: 18  SpO2: 98%    LABS:                          15.1   8.38  )-----------( 209      ( 07 May 2021 05:56 )             46.8         141  |  104  |  12  ----------------------------<  106<H>  4.0   |  29  |  1.0    Ca    9.0      07 May 2021 05:56  Mg     2.1         TPro  6.5  /  Alb  4.1  /  TBili  0.6  /  DBili  x   /  AST  12  /  ALT  13  /  AlkPhos  69      PT/INR - ( 06 May 2021 13:30 )   PT: 11.90 sec;   INR: 1.03 ratio         PTT - ( 06 May 2021 13:30 )  PTT:33.4 sec  Urinalysis Basic - ( 06 May 2021 15:45 )    Color: Colorless / Appearance: Clear / S.010 / pH: x  Gluc: x / Ketone: Small  / Bili: Negative / Urobili: <2 mg/dL   Blood: x / Protein: Negative / Nitrite: Negative   Leuk Esterase: Negative / RBC: x / WBC x   Sq Epi: x / Non Sq Epi: x / Bacteria: x        Troponin T, Serum: <0.01 ng/mL (21 @ 05:56)  Troponin T, Serum: <0.01 ng/mL (21 @ 20:00)  Troponin T, Serum: <0.01 ng/mL (21 @ 13:30)      CARDIAC MARKERS ( 07 May 2021 05:56 )  x     / <0.01 ng/mL / x     / x     / x      CARDIAC MARKERS ( 06 May 2021 20:00 )  x     / <0.01 ng/mL / x     / x     / x      CARDIAC MARKERS ( 06 May 2021 13:30 )  x     / <0.01 ng/mL / x     / x     / x          RADIOLOGY:    PHYSICAL EXAM:  GEN: No acute distress  HEENT: normocephalic, atraumatic, aniceteric  LUNGS: Clear to auscultation bilaterally, no rales/wheezing/ rhonchi  HEART: S1/S2 present. RRR, no murmurs  ABD: Soft, non-tender, non-distended. 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 Mild chronic microvascular ischemic changes.  - CTA head and neck : high grade stenosis right and left V4 vertebral arteries   - MR Brain non con pending  - TTE pending  - c/w ASA,  statin  - lipid panel, HbA1c, TSH pending  - Neuro f/u about ? plavix ? intervention    #Right Bundle Branch block   - EKG: NSR, RBBB  - tele monitoring   - troponins negative x3    #HTN- c/w amlodipine  #BPH- c/w finesteride     #DVT PPX: Lovenox 40mg  # GI PPX: not indicated  # Diet: DASH/TLC   # CHG BATH  # Activity : as tolerated   # Dispo: acute , pending MRI brain, TTE, neuro follow up    SUBJECTIVE:    Patient is a 75y old Male who presents with a chief complaint of   Currently admitted to medicine with the primary diagnosis of Stroke-like symptoms       Today is hospital day 1d. This morning he is resting comfortably in bed and reports no new issues or overnight events.     The patient notes decreased vision in right eye. Reports dizziness when he stands up too fast but notes this is unusual for him because he hydrates himself well and drinks a lot of water. The patient notes he is very active an exercises daily.     He is adamant about staying in the hospital for the work up and was not sure why he needed all the tests which are ordered. I went over the necessity of the TTE  and MRI as well as lab work in detail and spent at least 30 minutes with the patient and the wife at bedside. The patient was agreeable to stay at this time and expressed full understanding of his current medical condition and the results of CTA H/N.      ROS:   CONSTITUTIONAL: No weakness, fevers or chills , + DIZZINESS   EYES/ENT: DECREASED VISION RIGHT SIDE   NECK: No pain or stiffness   RESPIRATORY: No cough, wheezing, hemoptysis; No shortness of breath   CARDIOVASCULAR: No chest pain or palpitations   GASTROINTESTINAL: No abdominal or epigastric pain. No nausea, vomiting, or hematemesis; No diarrhea or constipation. No melena or hematochezia.  GENITOURINARY: No dysuria, frequency or hematuria  NEUROLOGICAL: No numbness or weakness  SKIN: No itching, rashes      PAST MEDICAL & SURGICAL HISTORY  HTN (hypertension)    History of BPH    No significant past surgical history      SOCIAL HISTORY:    ALLERGIES:  No Known Allergies    MEDICATIONS:  STANDING MEDICATIONS  amLODIPine   Tablet 10 milliGRAM(s) Oral daily  aspirin enteric coated 81 milliGRAM(s) Oral daily  atorvastatin 80 milliGRAM(s) Oral at bedtime  chlorhexidine 4% Liquid 1 Application(s) Topical <User Schedule>  enoxaparin Injectable 40 milliGRAM(s) SubCutaneous daily  finasteride 5 milliGRAM(s) Oral daily    PRN MEDICATIONS  acetaminophen   Tablet .. 650 milliGRAM(s) Oral every 6 hours PRN  LORazepam   Injectable 1 milliGRAM(s) IV Push once PRN  LORazepam   Injectable 0.5 milliGRAM(s) IV Push once PRN    VITALS:   T(F): 97.2  HR: 63  BP: 140/76  RR: 18  SpO2: 98%    LABS:                          15.1   8.38  )-----------( 209      ( 07 May 2021 05:56 )             46.8         141  |  104  |  12  ----------------------------<  106<H>  4.0   |  29  |  1.0    Ca    9.0      07 May 2021 05:56  Mg     2.1         TPro  6.5  /  Alb  4.1  /  TBili  0.6  /  DBili  x   /  AST  12  /  ALT  13  /  AlkPhos  69      PT/INR - ( 06 May 2021 13:30 )   PT: 11.90 sec;   INR: 1.03 ratio         PTT - ( 06 May 2021 13:30 )  PTT:33.4 sec  Urinalysis Basic - ( 06 May 2021 15:45 )    Color: Colorless / Appearance: Clear / S.010 / pH: x  Gluc: x / Ketone: Small  / Bili: Negative / Urobili: <2 mg/dL   Blood: x / Protein: Negative / Nitrite: Negative   Leuk Esterase: Negative / RBC: x / WBC x   Sq Epi: x / Non Sq Epi: x / Bacteria: x        Troponin T, Serum: <0.01 ng/mL (21 @ 05:56)  Troponin T, Serum: <0.01 ng/mL (21 @ 20:00)  Troponin T, Serum: <0.01 ng/mL (21 @ 13:30)      CARDIAC MARKERS ( 07 May 2021 05:56 )  x     / <0.01 ng/mL / x     / x     / x      CARDIAC MARKERS ( 06 May 2021 20:00 )  x     / <0.01 ng/mL / x     / x     / x      CARDIAC MARKERS ( 06 May 2021 13:30 )  x     / <0.01 ng/mL / x     / x     / x          RADIOLOGY:    PHYSICAL EXAM:  GEN: No acute distress  HEENT: normocephalic, atraumatic, aniceteric  LUNGS: Clear to auscultation bilaterally, no rales/wheezing/ rhonchi  HEART: S1/S2 present. RRR, no murmurs  ABD: Soft, non-tender, non-distended. 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 Mild chronic microvascular ischemic changes.  - CTA head and neck : high grade stenosis right and left V4 vertebral arteries   - MR Brain non con pending  - TTE pending  - c/w ASA,  statin  - lipid panel, HbA1c, TSH pending  - Ophthalmology c/s   - Neuro f/u about ? plavix ? intervention    #Right Bundle Branch block   - EKG: NSR, RBBB  - tele monitoring   - troponins negative x3    #HTN- c/w amlodipine  #BPH- c/w finesteride     #DVT PPX: Lovenox 40mg  # GI PPX: not indicated  # Diet: DASH/TLC   # CHG BATH  # Activity : as tolerated   # Dispo: acute , pending MRI brain, TTE, neuro follow up

## 2021-05-08 ENCOUNTER — TRANSCRIPTION ENCOUNTER (OUTPATIENT)
Age: 76
End: 2021-05-08

## 2021-05-08 VITALS
RESPIRATION RATE: 18 BRPM | DIASTOLIC BLOOD PRESSURE: 76 MMHG | HEART RATE: 56 BPM | SYSTOLIC BLOOD PRESSURE: 136 MMHG | TEMPERATURE: 97 F

## 2021-05-08 LAB
ALBUMIN SERPL ELPH-MCNC: 4.1 G/DL — SIGNIFICANT CHANGE UP (ref 3.5–5.2)
ALP SERPL-CCNC: 70 U/L — SIGNIFICANT CHANGE UP (ref 30–115)
ALT FLD-CCNC: 13 U/L — SIGNIFICANT CHANGE UP (ref 0–41)
ANION GAP SERPL CALC-SCNC: 10 MMOL/L — SIGNIFICANT CHANGE UP (ref 7–14)
AST SERPL-CCNC: 13 U/L — SIGNIFICANT CHANGE UP (ref 0–41)
BASOPHILS # BLD AUTO: 0.04 K/UL — SIGNIFICANT CHANGE UP (ref 0–0.2)
BASOPHILS NFR BLD AUTO: 0.6 % — SIGNIFICANT CHANGE UP (ref 0–1)
BILIRUB SERPL-MCNC: 0.6 MG/DL — SIGNIFICANT CHANGE UP (ref 0.2–1.2)
BUN SERPL-MCNC: 15 MG/DL — SIGNIFICANT CHANGE UP (ref 10–20)
CALCIUM SERPL-MCNC: 9 MG/DL — SIGNIFICANT CHANGE UP (ref 8.5–10.1)
CHLORIDE SERPL-SCNC: 104 MMOL/L — SIGNIFICANT CHANGE UP (ref 98–110)
CO2 SERPL-SCNC: 30 MMOL/L — SIGNIFICANT CHANGE UP (ref 17–32)
COVID-19 SPIKE DOMAIN AB INTERP: POSITIVE
COVID-19 SPIKE DOMAIN ANTIBODY RESULT: >250 U/ML — HIGH
CREAT SERPL-MCNC: 1 MG/DL — SIGNIFICANT CHANGE UP (ref 0.7–1.5)
EOSINOPHIL # BLD AUTO: 0.16 K/UL — SIGNIFICANT CHANGE UP (ref 0–0.7)
EOSINOPHIL NFR BLD AUTO: 2.3 % — SIGNIFICANT CHANGE UP (ref 0–8)
GLUCOSE SERPL-MCNC: 95 MG/DL — SIGNIFICANT CHANGE UP (ref 70–99)
HCT VFR BLD CALC: 48.1 % — SIGNIFICANT CHANGE UP (ref 42–52)
HGB BLD-MCNC: 15.8 G/DL — SIGNIFICANT CHANGE UP (ref 14–18)
IMM GRANULOCYTES NFR BLD AUTO: 0.3 % — SIGNIFICANT CHANGE UP (ref 0.1–0.3)
LYMPHOCYTES # BLD AUTO: 1.59 K/UL — SIGNIFICANT CHANGE UP (ref 1.2–3.4)
LYMPHOCYTES # BLD AUTO: 22.5 % — SIGNIFICANT CHANGE UP (ref 20.5–51.1)
MCHC RBC-ENTMCNC: 29.2 PG — SIGNIFICANT CHANGE UP (ref 27–31)
MCHC RBC-ENTMCNC: 32.8 G/DL — SIGNIFICANT CHANGE UP (ref 32–37)
MCV RBC AUTO: 88.7 FL — SIGNIFICANT CHANGE UP (ref 80–94)
MONOCYTES # BLD AUTO: 0.57 K/UL — SIGNIFICANT CHANGE UP (ref 0.1–0.6)
MONOCYTES NFR BLD AUTO: 8.1 % — SIGNIFICANT CHANGE UP (ref 1.7–9.3)
NEUTROPHILS # BLD AUTO: 4.69 K/UL — SIGNIFICANT CHANGE UP (ref 1.4–6.5)
NEUTROPHILS NFR BLD AUTO: 66.2 % — SIGNIFICANT CHANGE UP (ref 42.2–75.2)
NRBC # BLD: 0 /100 WBCS — SIGNIFICANT CHANGE UP (ref 0–0)
PLATELET # BLD AUTO: 229 K/UL — SIGNIFICANT CHANGE UP (ref 130–400)
POTASSIUM SERPL-MCNC: 4.2 MMOL/L — SIGNIFICANT CHANGE UP (ref 3.5–5)
POTASSIUM SERPL-SCNC: 4.2 MMOL/L — SIGNIFICANT CHANGE UP (ref 3.5–5)
PROT SERPL-MCNC: 6.5 G/DL — SIGNIFICANT CHANGE UP (ref 6–8)
RBC # BLD: 5.42 M/UL — SIGNIFICANT CHANGE UP (ref 4.7–6.1)
RBC # FLD: 13.6 % — SIGNIFICANT CHANGE UP (ref 11.5–14.5)
SARS-COV-2 IGG+IGM SERPL QL IA: >250 U/ML — HIGH
SARS-COV-2 IGG+IGM SERPL QL IA: POSITIVE
SODIUM SERPL-SCNC: 144 MMOL/L — SIGNIFICANT CHANGE UP (ref 135–146)
TSH SERPL-MCNC: 2.24 UIU/ML — SIGNIFICANT CHANGE UP (ref 0.27–4.2)
WBC # BLD: 7.07 K/UL — SIGNIFICANT CHANGE UP (ref 4.8–10.8)
WBC # FLD AUTO: 7.07 K/UL — SIGNIFICANT CHANGE UP (ref 4.8–10.8)

## 2021-05-08 PROCEDURE — 99239 HOSP IP/OBS DSCHRG MGMT >30: CPT

## 2021-05-08 PROCEDURE — 99231 SBSQ HOSP IP/OBS SF/LOW 25: CPT

## 2021-05-08 RX ORDER — ATORVASTATIN CALCIUM 80 MG/1
1 TABLET, FILM COATED ORAL
Qty: 14 | Refills: 0
Start: 2021-05-08 | End: 2021-05-21

## 2021-05-08 RX ADMIN — ENOXAPARIN SODIUM 40 MILLIGRAM(S): 100 INJECTION SUBCUTANEOUS at 11:36

## 2021-05-08 RX ADMIN — AMLODIPINE BESYLATE 10 MILLIGRAM(S): 2.5 TABLET ORAL at 06:25

## 2021-05-08 RX ADMIN — Medication 81 MILLIGRAM(S): at 11:35

## 2021-05-08 RX ADMIN — FINASTERIDE 5 MILLIGRAM(S): 5 TABLET, FILM COATED ORAL at 11:36

## 2021-05-08 NOTE — OCCUPATIONAL THERAPY INITIAL EVALUATION ADULT - PATIENT PROFILE REVIEW, REHAB EVAL
[id number] Pt chart thoroughly reviewed prior to OT evaluation./yes
Pt's chart thoroughly reviewed. Pt seen for OT IE 8:30-9:00am/yes

## 2021-05-08 NOTE — OCCUPATIONAL THERAPY INITIAL EVALUATION ADULT - VISUAL ACUITY
Pt reports poor vision L eye (minimal light) x 3 years. New onset of R eye blurry vision. Difficulty reading scores of sports games on TV as well as reading close up. Pt also reports new onset of food dropping off fork as he is bringing the utensil close to his mouth. Pt feels that the cause of this is because he cannot see it. Pt started to notice this x a few weeks. Pt reports hx of eye lid infection in both eyes Pt reports poor vision L eye (minimal light) x 3 years. New onset of R eye blurry vision. Difficulty reading scores of sports games on TV as well as reading close up. Pt also reports new onset of food dropping off fork as he is bringing the utensil close to his mouth. Pt feels that the cause of this is because he cannot see it. Pt started to notice this x a few weeks. Pt reports hx of eye lid infection in both eyes. +light sensitivity

## 2021-05-08 NOTE — DISCHARGE NOTE PROVIDER - NSDCCPCAREPLAN_GEN_ALL_CORE_FT
PRINCIPAL DISCHARGE DIAGNOSIS  Diagnosis: Stroke-like symptoms  Assessment and Plan of Treatment: PLEASE FOLLOW UP WITH YOUR PRIMARY CARE DOCTOR, NEUROLOGIST IN ONE WEEK.

## 2021-05-08 NOTE — PROGRESS NOTE ADULT - ATTENDING COMMENTS
visited the patient on 5/8. Agree with the note and physical findings. Brain MRI showed no acute infarct. Acute vertigo symptoms resolved. Has partial hemianopia from prior stroke. Continue ASA and Lipitor 40 mg. Ok to DC

## 2021-05-08 NOTE — PROGRESS NOTE ADULT - SUBJECTIVE AND OBJECTIVE BOX
Neurology Progress Note    Interval History:  patient is examined at the bedside, his dizziness has subsided, no acute stroke on MRI.    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 Patient had a prior CVA and the symptoms are similar. He endorses dizziness for the past three days associated with nausea. He endorses lightheaded dizzy sensation when he stands up to fast. He has had vision changes as well, decreased ability to see distance. He has a frontal headache, non radiating, not relieved or exacerbated by anything. Symptoms have progressed over the previous three days so he came in. He has had decreased PO intake. Denies dysarthria, aphasia, or weakness. Denies CP, SOB, abdominal pain, or changes in urination/ bowel movements.   In ED Vitals: Temp 97, HR 66, / 86, RR 20 SpO2 100%. EKG: NSR, RBB. No previous EKG to compare to, no history of CAD. CT: No acute intracranial pathology. Mild chronic microvascular ischemic changes. Troponin negative. Patient being admitted for CVA Vs BPV.      (06 May 2021 16:40)      PAST MEDICAL & SURGICAL HISTORY:  HTN (hypertension)    History of BPH    No significant past surgical history        Medications:  acetaminophen   Tablet .. 650 milliGRAM(s) Oral every 6 hours PRN  amLODIPine   Tablet 10 milliGRAM(s) Oral daily  aspirin enteric coated 81 milliGRAM(s) Oral daily  atorvastatin 80 milliGRAM(s) Oral at bedtime  chlorhexidine 4% Liquid 1 Application(s) Topical <User Schedule>  enoxaparin Injectable 40 milliGRAM(s) SubCutaneous daily  finasteride 5 milliGRAM(s) Oral daily  LORazepam   Injectable 0.5 milliGRAM(s) IV Push once PRN      Vital Signs Last 24 Hrs  T(C): 36.1 (08 May 2021 04:49), Max: 36.4 (07 May 2021 14:35)  T(F): 96.9 (08 May 2021 04:49), Max: 97.6 (07 May 2021 14:35)  HR: 56 (08 May 2021 04:49) (56 - 71)  BP: 136/76 (08 May 2021 04:49) (136/76 - 145/72)  BP(mean): --  RR: 18 (08 May 2021 04:49) (18 - 18)  SpO2: --    Neurological Exam:   Mental status: Awake, alert and oriented x3.  Recent and remote memory intact.  Naming, repetition and comprehension intact.  Attention/concentration intact.  No dysarthria, no aphasia.  Fund of knowledge appropriate.    Cranial nerves: Pupils equally round and reactive to light, visual fields full, no nystagmus, extraocular muscles intact, V1 through V3 intact bilaterally and symmetric, face symmetric, hearing intact to finger rub, palate elevation symmetric, tongue was midline.  Motor:  MRC grading 5/5 b/l UE/LE.   strength 5/5.  Normal tone and bulk.  No abnormal movements.    Sensation: Intact to light touch, proprioception, and pinprick.   Coordination: No dysmetria on finger-to-nose and heel-to-shin.  No dysdiadokinesia.  Reflexes: 2+ in bilateral UE/LE, downgoing toes bilaterally. (-) Mares.  Gait: Narrow and steady. No ataxia.    Labs:  CBC Full  -  ( 08 May 2021 07:18 )  WBC Count : 7.07 K/uL  RBC Count : 5.42 M/uL  Hemoglobin : 15.8 g/dL  Hematocrit : 48.1 %  Platelet Count - Automated : 229 K/uL  Mean Cell Volume : 88.7 fL  Mean Cell Hemoglobin : 29.2 pg  Mean Cell Hemoglobin Concentration : 32.8 g/dL  Auto Neutrophil # : 4.69 K/uL  Auto Lymphocyte # : 1.59 K/uL  Auto Monocyte # : 0.57 K/uL  Auto Eosinophil # : 0.16 K/uL  Auto Basophil # : 0.04 K/uL  Auto Neutrophil % : 66.2 %  Auto Lymphocyte % : 22.5 %  Auto Monocyte % : 8.1 %  Auto Eosinophil % : 2.3 %  Auto Basophil % : 0.6 %        144  |  104  |  15  ----------------------------<  95  4.2   |  30  |  1.0    Ca    9.0      08 May 2021 07:18  Mg     2.1         TPro  6.5  /  Alb  4.1  /  TBili  0.6  /  DBili  x   /  AST  13  /  ALT  13  /  AlkPhos  70      LIVER FUNCTIONS - ( 08 May 2021 07:18 )  Alb: 4.1 g/dL / Pro: 6.5 g/dL / ALK PHOS: 70 U/L / ALT: 13 U/L / AST: 13 U/L / GGT: x           PT/INR - ( 06 May 2021 13:30 )   PT: 11.90 sec;   INR: 1.03 ratio         PTT - ( 06 May 2021 13:30 )  PTT:33.4 sec  Urinalysis Basic - ( 06 May 2021 15:45 )    Color: Colorless / Appearance: Clear / S.010 / pH: x  Gluc: x / Ketone: Small  / Bili: Negative / Urobili: <2 mg/dL   Blood: x / Protein: Negative / Nitrite: Negative   Leuk Esterase: Negative / RBC: x / WBC x   Sq Epi: x / Non Sq Epi: x / Bacteria: x        Assessment:  This is a 75y Male w/ h/o     Plan:

## 2021-05-08 NOTE — DISCHARGE NOTE NURSING/CASE MANAGEMENT/SOCIAL WORK - PATIENT PORTAL LINK FT
You can access the FollowMyHealth Patient Portal offered by [facility] by registering at the following website: [email or web address]. By joining [facility]’s FollowMyHealth portal, you will also be able to view your health information using other applications (apps) compatible with our system.

## 2021-05-08 NOTE — OCCUPATIONAL THERAPY INITIAL EVALUATION ADULT - PLANNED THERAPY INTERVENTIONS, OT EVAL
ADL retraining/balance training/cognitive, visual perceptual/fine motor coordination training/joint mobilization/motor coordination training/neuromuscular re-education/strengthening/stretching/transfer training

## 2021-05-08 NOTE — DISCHARGE NOTE PROVIDER - HOSPITAL COURSE
75M PMHx HTN, CVA with residual L eye blindness here with diizziness. Dizziness worsen with downward gaze, worsen with head position, +nausea, no vomiting, +room spinning. Admitted to medicine, cth without acute pathology, +chronic microvascular ischemic changes. CTA with bl vert artery stenosis. Underwent mri, neg per neuro read. Seen by neuro, dizziness resolved. Stable for d/c, needs outpt pmd, neuro f/u one week.    41 minutes spent on discharge planning.

## 2021-05-08 NOTE — OCCUPATIONAL THERAPY INITIAL EVALUATION ADULT - FINE MOTOR COORDINATION, RIGHT HAND, MANIPULATE OBJECTS, OT EVAL
Pt reports change in handwriting, unclear for how long, poor legiibility which is a change from past. Pt reports change in handwriting, unclear for how long, poor legiibility which is a change from past. Also reports difficulty at times with buttons and clasping his watch

## 2021-05-08 NOTE — OCCUPATIONAL THERAPY INITIAL EVALUATION ADULT - PERTINENT HX OF CURRENT PROBLEM, REHAB EVAL
Pt is a right hand dominant male admitted 5/6 with c/o intermittent dizziness x 3 days before presenting to ED. HX of CVA with residual visual deficits R eye. CTH (-), MRI results pending

## 2021-05-08 NOTE — PROGRESS NOTE ADULT - ASSESSMENT
75y M pmh HTN, CVA with residual Left eye vision loss presenting with intermittent dizziness x3 days. Acute onset. No trauma/falls. No headache. Worse with head movements. Associated with nausea. Room spinning sensation. No chest pain/SOB. +right eye blurry vision. No f/c. +hissing noises to b/l ears when he watches tv sometimes. Ambulatory.   Vision changes could  be a combination of residual effects of old stroke.      MRI brain is negative for acute stroke.        Recommendations:  - OK for d/c  - Continue ASA and Lipitor 40mg  - Avoid dehydration and hypotension          Plan was discussed with neuroattending Dr. Lau  
75M PMHx HTN, CVA with residual L eye blindness here with diizziness.     #Dizziness, described as +room spinning, +nausea, no vomiting  worsen with downward gaze, head position  suspect bppv  mri neg per neuro read  cth neg  cta with bl vert stenosis  stable for d/c, needs outpt pmd, neuro f/u one week  #CVA  asa  lipitor 80  #HTN  norvasc 10  #DVT ppx  lovenox    
75y M pmh HTN, BPH, CVA with residual Left eye vision loss presenting with intermittent dizziness for the past three days.    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 Mild chronic microvascular ischemic changes.  - CTA head and neck : high grade stenosis right and left V4 vertebral arteries   - MR Brain non con pending  - TTE pending  - c/w ASA,  statin  - Ophthalmology c/s regarding new Rt sided vision problems  -permissive HTN for now  -suspect BPV, if MRI negative, can be discharged to f/u w/ outpt vestibular therapy, ENT  -Meclizine PRN    #HTN- c/w amlodipine  #BPH- c/w finesteride     #DVT PPX: Lovenox 40mg  # GI PPX: not indicated  # Diet: DASH/TLC   # CHG BATH  # Activity : as tolerated     #Progress Note Handoff  Pending: MRI, TTE (only if MRI abnl)  Pt/Family discussion: Pt/spouse informed and agree with the current plan but might consider leaving AMA if MRI not done and read today.  Disposition: Home___x___    My note supersedes the residents note should a discrepancy arise.    Chart and consultant notes personally reviewed.  Care Discussed with Consultants/Other Providers/ Housestaff [ x] YES [ ] NO   Radiology, labs, old records personally reviewed.    d/w Housestaff, nursing, case mgmt, neuro Dr. Lau

## 2021-05-08 NOTE — OCCUPATIONAL THERAPY INITIAL EVALUATION ADULT - SHORT TERM MEMORY, REHAB EVAL
Appears intact during IE/intact Appears intact during IE however pt reports intermittent instances of word finding difficulty/intact

## 2021-05-08 NOTE — PROGRESS NOTE ADULT - SUBJECTIVE AND OBJECTIVE BOX
INTERVAL HPI/OVERNIGHT EVENTS:    SUBJECTIVE: Patient seen and examined at bedside.     no cp, sob, abd pain, fever  no ha, syncope, lightheadedness, +dizziness  OBJECTIVE:    VITAL SIGNS:  Vital Signs Last 24 Hrs  T(C): 36.1 (08 May 2021 04:49), Max: 36.4 (07 May 2021 14:35)  T(F): 96.9 (08 May 2021 04:49), Max: 97.6 (07 May 2021 14:35)  HR: 56 (08 May 2021 04:49) (56 - 71)  BP: 136/76 (08 May 2021 04:49) (136/76 - 145/72)  BP(mean): --  RR: 18 (08 May 2021 04:49) (18 - 18)  SpO2: --      PHYSICAL EXAM:    General: NAD  HEENT: NC/AT; PERRL, clear conjunctiva  Neck: supple  Respiratory: CTA b/l  Cardiovascular: +S1/S2; RRR  Abdomen: soft, NT/ND; +BS x4  Extremities: WWP, 2+ peripheral pulses b/l; no LE edema  Skin: normal color and turgor; no rash  Neurological:    MEDICATIONS:  MEDICATIONS  (STANDING):  amLODIPine   Tablet 10 milliGRAM(s) Oral daily  aspirin enteric coated 81 milliGRAM(s) Oral daily  atorvastatin 80 milliGRAM(s) Oral at bedtime  chlorhexidine 4% Liquid 1 Application(s) Topical <User Schedule>  enoxaparin Injectable 40 milliGRAM(s) SubCutaneous daily  finasteride 5 milliGRAM(s) Oral daily    MEDICATIONS  (PRN):  acetaminophen   Tablet .. 650 milliGRAM(s) Oral every 6 hours PRN Mild Pain (1 - 3)  LORazepam   Injectable 0.5 milliGRAM(s) IV Push once PRN Anxiety      ALLERGIES:  Allergies    No Known Allergies    Intolerances        LABS:                        15.8   7.07  )-----------( 229      ( 08 May 2021 07:18 )             48.1     Hemoglobin: 15.8 g/dL ( @ 07:18)  Hemoglobin: 15.1 g/dL ( @ 05:56)  Hemoglobin: 16.1 g/dL ( @ 13:30)    CBC Full  -  ( 08 May 2021 07:18 )  WBC Count : 7.07 K/uL  RBC Count : 5.42 M/uL  Hemoglobin : 15.8 g/dL  Hematocrit : 48.1 %  Platelet Count - Automated : 229 K/uL  Mean Cell Volume : 88.7 fL  Mean Cell Hemoglobin : 29.2 pg  Mean Cell Hemoglobin Concentration : 32.8 g/dL  Auto Neutrophil # : 4.69 K/uL  Auto Lymphocyte # : 1.59 K/uL  Auto Monocyte # : 0.57 K/uL  Auto Eosinophil # : 0.16 K/uL  Auto Basophil # : 0.04 K/uL  Auto Neutrophil % : 66.2 %  Auto Lymphocyte % : 22.5 %  Auto Monocyte % : 8.1 %  Auto Eosinophil % : 2.3 %  Auto Basophil % : 0.6 %    08    144  |  104  |  15  ----------------------------<  95  4.2   |  30  |  1.0    Ca    9.0      08 May 2021 07:18  Mg     2.1     05-07    TPro  6.5  /  Alb  4.1  /  TBili  0.6  /  DBili  x   /  AST  13  /  ALT  13  /  AlkPhos  70  05-08    Creatinine Trend: 1.0<--, 1.0<--, 0.8<--  LIVER FUNCTIONS - ( 08 May 2021 07:18 )  Alb: 4.1 g/dL / Pro: 6.5 g/dL / ALK PHOS: 70 U/L / ALT: 13 U/L / AST: 13 U/L / GGT: x           PT/INR - ( 06 May 2021 13:30 )   PT: 11.90 sec;   INR: 1.03 ratio         PTT - ( 06 May 2021 13:30 )  PTT:33.4 sec    hs Troponin:            Urinalysis Basic - ( 06 May 2021 15:45 )    Color: Colorless / Appearance: Clear / S.010 / pH: x  Gluc: x / Ketone: Small  / Bili: Negative / Urobili: <2 mg/dL   Blood: x / Protein: Negative / Nitrite: Negative   Leuk Esterase: Negative / RBC: x / WBC x   Sq Epi: x / Non Sq Epi: x / Bacteria: x      CSF:                      EKG:   MICROBIOLOGY:    IMAGING:      Labs, imaging, EKG personally reviewed    RADIOLOGY & ADDITIONAL TESTS: Reviewed.

## 2021-05-08 NOTE — DISCHARGE NOTE PROVIDER - NSDCMRMEDTOKEN_GEN_ALL_CORE_FT
amLODIPine 10 mg oral tablet: 1 tab(s) orally once a day  aspirin 81 mg oral tablet: 1 tab(s) orally once a day  atorvastatin 80 mg oral tablet: 1 tab(s) orally once a day (at bedtime)  finasteride 5 mg oral tablet: 1 tab(s) orally once a day

## 2021-05-08 NOTE — DISCHARGE NOTE PROVIDER - CARE PROVIDER_API CALL
Raymundo Lau)  Neurology  [address] [place] [address]  Phone: [phone]  Fax: [phone]  Follow Up Time:     Prasanth Miguel  [facility] Medicine  [address] [place] [address]  Phone: [phone]  Fax: [phone]  Follow Up Time:

## 2021-05-14 DIAGNOSIS — R42 DIZZINESS AND GIDDINESS: ICD-10-CM

## 2021-05-14 DIAGNOSIS — H54.7 UNSPECIFIED VISUAL LOSS: ICD-10-CM

## 2021-05-14 DIAGNOSIS — R11.0 NAUSEA: ICD-10-CM

## 2021-05-14 DIAGNOSIS — I69.398 OTHER SEQUELAE OF CEREBRAL INFARCTION: ICD-10-CM

## 2021-05-14 DIAGNOSIS — I45.10 UNSPECIFIED RIGHT BUNDLE-BRANCH BLOCK: ICD-10-CM

## 2021-05-14 DIAGNOSIS — H47.20 UNSPECIFIED OPTIC ATROPHY: ICD-10-CM

## 2021-05-14 DIAGNOSIS — I10 ESSENTIAL (PRIMARY) HYPERTENSION: ICD-10-CM

## 2021-05-14 DIAGNOSIS — H53.47 HETERONYMOUS BILATERAL FIELD DEFECTS: ICD-10-CM

## 2021-05-14 DIAGNOSIS — I65.03 OCCLUSION AND STENOSIS OF BILATERAL VERTEBRAL ARTERIES: ICD-10-CM

## 2021-05-14 DIAGNOSIS — N40.0 BENIGN PROSTATIC HYPERPLASIA WITHOUT LOWER URINARY TRACT SYMPTOMS: ICD-10-CM

## 2021-05-14 DIAGNOSIS — H81.11 BENIGN PAROXYSMAL VERTIGO, RIGHT EAR: ICD-10-CM

## 2022-08-08 PROBLEM — I10 ESSENTIAL (PRIMARY) HYPERTENSION: Chronic | Status: ACTIVE | Noted: 2021-05-06

## 2022-08-08 PROBLEM — Z87.438 PERSONAL HISTORY OF OTHER DISEASES OF MALE GENITAL ORGANS: Chronic | Status: ACTIVE | Noted: 2021-05-06

## 2022-08-12 ENCOUNTER — OUTPATIENT (OUTPATIENT)
Dept: OUTPATIENT SERVICES | Facility: HOSPITAL | Age: 77
LOS: 1 days | Discharge: HOME | End: 2022-08-12

## 2022-08-12 DIAGNOSIS — R42 DIZZINESS AND GIDDINESS: ICD-10-CM

## 2022-08-12 PROBLEM — Z00.00 ENCOUNTER FOR PREVENTIVE HEALTH EXAMINATION: Status: ACTIVE | Noted: 2022-08-12

## 2022-08-12 PROCEDURE — 70546 MR ANGIOGRAPH HEAD W/O&W/DYE: CPT | Mod: 26

## 2023-02-17 ENCOUNTER — INPATIENT (INPATIENT)
Facility: HOSPITAL | Age: 78
LOS: 0 days | Discharge: LEFT BEFORE DISCHARGE | DRG: 63 | End: 2023-02-18
Attending: INTERNAL MEDICINE | Admitting: INTERNAL MEDICINE
Payer: MEDICARE

## 2023-02-17 VITALS
HEART RATE: 66 BPM | OXYGEN SATURATION: 100 % | TEMPERATURE: 96 F | RESPIRATION RATE: 20 BRPM | SYSTOLIC BLOOD PRESSURE: 160 MMHG | DIASTOLIC BLOOD PRESSURE: 74 MMHG | WEIGHT: 181.22 LBS

## 2023-02-17 DIAGNOSIS — I63.9 CEREBRAL INFARCTION, UNSPECIFIED: ICD-10-CM

## 2023-02-17 LAB
ALBUMIN SERPL ELPH-MCNC: 4.4 G/DL — SIGNIFICANT CHANGE UP (ref 3.5–5.2)
ALP SERPL-CCNC: 80 U/L — SIGNIFICANT CHANGE UP (ref 30–115)
ALT FLD-CCNC: 22 U/L — SIGNIFICANT CHANGE UP (ref 0–41)
ANION GAP SERPL CALC-SCNC: 11 MMOL/L — SIGNIFICANT CHANGE UP (ref 7–14)
APPEARANCE UR: CLEAR — SIGNIFICANT CHANGE UP
APTT BLD: 29.3 SEC — SIGNIFICANT CHANGE UP (ref 27–39.2)
AST SERPL-CCNC: 21 U/L — SIGNIFICANT CHANGE UP (ref 0–41)
BASOPHILS # BLD AUTO: 0.05 K/UL — SIGNIFICANT CHANGE UP (ref 0–0.2)
BASOPHILS NFR BLD AUTO: 0.5 % — SIGNIFICANT CHANGE UP (ref 0–1)
BILIRUB SERPL-MCNC: 0.5 MG/DL — SIGNIFICANT CHANGE UP (ref 0.2–1.2)
BILIRUB UR-MCNC: NEGATIVE — SIGNIFICANT CHANGE UP
BUN SERPL-MCNC: 16 MG/DL — SIGNIFICANT CHANGE UP (ref 10–20)
CALCIUM SERPL-MCNC: 9.1 MG/DL — SIGNIFICANT CHANGE UP (ref 8.4–10.5)
CHLORIDE SERPL-SCNC: 106 MMOL/L — SIGNIFICANT CHANGE UP (ref 98–110)
CO2 SERPL-SCNC: 26 MMOL/L — SIGNIFICANT CHANGE UP (ref 17–32)
COLOR SPEC: YELLOW — SIGNIFICANT CHANGE UP
CREAT SERPL-MCNC: 1.1 MG/DL — SIGNIFICANT CHANGE UP (ref 0.7–1.5)
DIFF PNL FLD: NEGATIVE — SIGNIFICANT CHANGE UP
EGFR: 68 ML/MIN/1.73M2 — SIGNIFICANT CHANGE UP
EOSINOPHIL # BLD AUTO: 0.32 K/UL — SIGNIFICANT CHANGE UP (ref 0–0.7)
EOSINOPHIL NFR BLD AUTO: 2.9 % — SIGNIFICANT CHANGE UP (ref 0–8)
ETHANOL SERPL-MCNC: <10 MG/DL — SIGNIFICANT CHANGE UP
GLUCOSE SERPL-MCNC: 190 MG/DL — HIGH (ref 70–99)
GLUCOSE UR QL: 100 MG/DL
HCT VFR BLD CALC: 43.8 % — SIGNIFICANT CHANGE UP (ref 42–52)
HGB BLD-MCNC: 14.5 G/DL — SIGNIFICANT CHANGE UP (ref 14–18)
IMM GRANULOCYTES NFR BLD AUTO: 0.5 % — HIGH (ref 0.1–0.3)
INR BLD: 1.03 RATIO — SIGNIFICANT CHANGE UP (ref 0.65–1.3)
KETONES UR-MCNC: NEGATIVE — SIGNIFICANT CHANGE UP
LEUKOCYTE ESTERASE UR-ACNC: NEGATIVE — SIGNIFICANT CHANGE UP
LYMPHOCYTES # BLD AUTO: 18.4 % — LOW (ref 20.5–51.1)
LYMPHOCYTES # BLD AUTO: 2.01 K/UL — SIGNIFICANT CHANGE UP (ref 1.2–3.4)
MCHC RBC-ENTMCNC: 29.2 PG — SIGNIFICANT CHANGE UP (ref 27–31)
MCHC RBC-ENTMCNC: 33.1 G/DL — SIGNIFICANT CHANGE UP (ref 32–37)
MCV RBC AUTO: 88.1 FL — SIGNIFICANT CHANGE UP (ref 80–94)
MONOCYTES # BLD AUTO: 0.64 K/UL — HIGH (ref 0.1–0.6)
MONOCYTES NFR BLD AUTO: 5.9 % — SIGNIFICANT CHANGE UP (ref 1.7–9.3)
NEUTROPHILS # BLD AUTO: 7.86 K/UL — HIGH (ref 1.4–6.5)
NEUTROPHILS NFR BLD AUTO: 71.8 % — SIGNIFICANT CHANGE UP (ref 42.2–75.2)
NITRITE UR-MCNC: NEGATIVE — SIGNIFICANT CHANGE UP
NRBC # BLD: 0 /100 WBCS — SIGNIFICANT CHANGE UP (ref 0–0)
PH UR: 7.5 — SIGNIFICANT CHANGE UP (ref 5–8)
PLATELET # BLD AUTO: 215 K/UL — SIGNIFICANT CHANGE UP (ref 130–400)
POTASSIUM SERPL-MCNC: 3.7 MMOL/L — SIGNIFICANT CHANGE UP (ref 3.5–5)
POTASSIUM SERPL-SCNC: 3.7 MMOL/L — SIGNIFICANT CHANGE UP (ref 3.5–5)
PROT SERPL-MCNC: 6.7 G/DL — SIGNIFICANT CHANGE UP (ref 6–8)
PROT UR-MCNC: NEGATIVE MG/DL — SIGNIFICANT CHANGE UP
PROTHROM AB SERPL-ACNC: 11.8 SEC — SIGNIFICANT CHANGE UP (ref 9.95–12.87)
RBC # BLD: 4.97 M/UL — SIGNIFICANT CHANGE UP (ref 4.7–6.1)
RBC # FLD: 13.3 % — SIGNIFICANT CHANGE UP (ref 11.5–14.5)
SARS-COV-2 RNA SPEC QL NAA+PROBE: SIGNIFICANT CHANGE UP
SODIUM SERPL-SCNC: 143 MMOL/L — SIGNIFICANT CHANGE UP (ref 135–146)
SP GR SPEC: 1.01 — SIGNIFICANT CHANGE UP (ref 1.01–1.03)
TROPONIN T SERPL-MCNC: <0.01 NG/ML — SIGNIFICANT CHANGE UP
UROBILINOGEN FLD QL: 0.2 MG/DL — SIGNIFICANT CHANGE UP
WBC # BLD: 10.93 K/UL — HIGH (ref 4.8–10.8)
WBC # FLD AUTO: 10.93 K/UL — HIGH (ref 4.8–10.8)

## 2023-02-17 PROCEDURE — 70450 CT HEAD/BRAIN W/O DYE: CPT

## 2023-02-17 PROCEDURE — 99223 1ST HOSP IP/OBS HIGH 75: CPT | Mod: 25

## 2023-02-17 PROCEDURE — 93005 ELECTROCARDIOGRAM TRACING: CPT

## 2023-02-17 PROCEDURE — 80061 LIPID PANEL: CPT

## 2023-02-17 PROCEDURE — 70496 CT ANGIOGRAPHY HEAD: CPT | Mod: 26,MA

## 2023-02-17 PROCEDURE — 0042T: CPT | Mod: MA

## 2023-02-17 PROCEDURE — 97162 PT EVAL MOD COMPLEX 30 MIN: CPT | Mod: GP

## 2023-02-17 PROCEDURE — 84443 ASSAY THYROID STIM HORMONE: CPT

## 2023-02-17 PROCEDURE — 83036 HEMOGLOBIN GLYCOSYLATED A1C: CPT

## 2023-02-17 PROCEDURE — 80048 BASIC METABOLIC PNL TOTAL CA: CPT

## 2023-02-17 PROCEDURE — 81003 URINALYSIS AUTO W/O SCOPE: CPT

## 2023-02-17 PROCEDURE — 36415 COLL VENOUS BLD VENIPUNCTURE: CPT

## 2023-02-17 PROCEDURE — 70450 CT HEAD/BRAIN W/O DYE: CPT | Mod: 26,MA

## 2023-02-17 PROCEDURE — 92610 EVALUATE SWALLOWING FUNCTION: CPT | Mod: GN

## 2023-02-17 PROCEDURE — 99291 CRITICAL CARE FIRST HOUR: CPT

## 2023-02-17 PROCEDURE — 93010 ELECTROCARDIOGRAM REPORT: CPT | Mod: 76

## 2023-02-17 PROCEDURE — 85027 COMPLETE CBC AUTOMATED: CPT

## 2023-02-17 PROCEDURE — 93306 TTE W/DOPPLER COMPLETE: CPT | Mod: 26

## 2023-02-17 PROCEDURE — 93306 TTE W/DOPPLER COMPLETE: CPT

## 2023-02-17 PROCEDURE — 97165 OT EVAL LOW COMPLEX 30 MIN: CPT | Mod: GO

## 2023-02-17 PROCEDURE — 71045 X-RAY EXAM CHEST 1 VIEW: CPT | Mod: 26

## 2023-02-17 PROCEDURE — 70498 CT ANGIOGRAPHY NECK: CPT | Mod: 26,MA

## 2023-02-17 RX ORDER — AMITRIPTYLINE HCL 25 MG
10 TABLET ORAL AT BEDTIME
Refills: 0 | Status: DISCONTINUED | OUTPATIENT
Start: 2023-02-17 | End: 2023-02-18

## 2023-02-17 RX ORDER — AMITRIPTYLINE HCL 25 MG
1 TABLET ORAL
Qty: 0 | Refills: 0 | DISCHARGE

## 2023-02-17 RX ORDER — ONDANSETRON 8 MG/1
4 TABLET, FILM COATED ORAL ONCE
Refills: 0 | Status: COMPLETED | OUTPATIENT
Start: 2023-02-17 | End: 2023-02-17

## 2023-02-17 RX ORDER — LATANOPROST 0.05 MG/ML
2 SOLUTION/ DROPS OPHTHALMIC; TOPICAL AT BEDTIME
Refills: 0 | Status: DISCONTINUED | OUTPATIENT
Start: 2023-02-17 | End: 2023-02-17

## 2023-02-17 RX ORDER — SODIUM CHLORIDE 9 MG/ML
10 INJECTION INTRAMUSCULAR; INTRAVENOUS; SUBCUTANEOUS ONCE
Refills: 0 | Status: COMPLETED | OUTPATIENT
Start: 2023-02-17 | End: 2023-02-17

## 2023-02-17 RX ORDER — LATANOPROST 0.05 MG/ML
1 SOLUTION/ DROPS OPHTHALMIC; TOPICAL AT BEDTIME
Refills: 0 | Status: DISCONTINUED | OUTPATIENT
Start: 2023-02-17 | End: 2023-02-18

## 2023-02-17 RX ORDER — LATANOPROST 0.05 MG/ML
2 SOLUTION/ DROPS OPHTHALMIC; TOPICAL
Qty: 0 | Refills: 0 | DISCHARGE

## 2023-02-17 RX ORDER — SODIUM CHLORIDE 9 MG/ML
1000 INJECTION, SOLUTION INTRAVENOUS
Refills: 0 | Status: DISCONTINUED | OUTPATIENT
Start: 2023-02-17 | End: 2023-02-18

## 2023-02-17 RX ORDER — TENECTEPLASE 50 MG
21 KIT INTRAVENOUS ONCE
Refills: 0 | Status: COMPLETED | OUTPATIENT
Start: 2023-02-17 | End: 2023-02-17

## 2023-02-17 RX ORDER — FINASTERIDE 5 MG/1
5 TABLET, FILM COATED ORAL DAILY
Refills: 0 | Status: DISCONTINUED | OUTPATIENT
Start: 2023-02-17 | End: 2023-02-18

## 2023-02-17 RX ORDER — PANTOPRAZOLE SODIUM 20 MG/1
40 TABLET, DELAYED RELEASE ORAL
Refills: 0 | Status: DISCONTINUED | OUTPATIENT
Start: 2023-02-17 | End: 2023-02-18

## 2023-02-17 RX ORDER — OMEPRAZOLE 10 MG/1
1 CAPSULE, DELAYED RELEASE ORAL
Qty: 0 | Refills: 0 | DISCHARGE

## 2023-02-17 RX ORDER — MECLIZINE HCL 12.5 MG
25 TABLET ORAL ONCE
Refills: 0 | Status: COMPLETED | OUTPATIENT
Start: 2023-02-17 | End: 2023-02-17

## 2023-02-17 RX ORDER — ATORVASTATIN CALCIUM 80 MG/1
80 TABLET, FILM COATED ORAL AT BEDTIME
Refills: 0 | Status: DISCONTINUED | OUTPATIENT
Start: 2023-02-17 | End: 2023-02-18

## 2023-02-17 RX ADMIN — ONDANSETRON 4 MILLIGRAM(S): 8 TABLET, FILM COATED ORAL at 08:26

## 2023-02-17 RX ADMIN — ONDANSETRON 104 MILLIGRAM(S): 8 TABLET, FILM COATED ORAL at 07:03

## 2023-02-17 RX ADMIN — FINASTERIDE 5 MILLIGRAM(S): 5 TABLET, FILM COATED ORAL at 12:06

## 2023-02-17 RX ADMIN — ATORVASTATIN CALCIUM 80 MILLIGRAM(S): 80 TABLET, FILM COATED ORAL at 22:11

## 2023-02-17 RX ADMIN — TENECTEPLASE 3024 MILLIGRAM(S): KIT at 07:27

## 2023-02-17 RX ADMIN — LATANOPROST 1 DROP(S): 0.05 SOLUTION/ DROPS OPHTHALMIC; TOPICAL at 22:12

## 2023-02-17 RX ADMIN — Medication 10 MILLIGRAM(S): at 22:12

## 2023-02-17 RX ADMIN — SODIUM CHLORIDE 75 MILLILITER(S): 9 INJECTION, SOLUTION INTRAVENOUS at 12:06

## 2023-02-17 RX ADMIN — SODIUM CHLORIDE 10 MILLILITER(S): 9 INJECTION INTRAMUSCULAR; INTRAVENOUS; SUBCUTANEOUS at 07:26

## 2023-02-17 RX ADMIN — SODIUM CHLORIDE 10 MILLILITER(S): 9 INJECTION INTRAMUSCULAR; INTRAVENOUS; SUBCUTANEOUS at 07:28

## 2023-02-17 RX ADMIN — SODIUM CHLORIDE 75 MILLILITER(S): 9 INJECTION, SOLUTION INTRAVENOUS at 20:26

## 2023-02-17 RX ADMIN — Medication 25 MILLIGRAM(S): at 07:03

## 2023-02-17 NOTE — ED PROVIDER NOTE - DIFFERENTIAL DIAGNOSIS
Stroke versus peripheral vertigo versus cardiac dysrhythmia versus orthostasis Differential Diagnosis

## 2023-02-17 NOTE — CONSULT NOTE ADULT - ASSESSMENT
- Post TNK plan   - SBP<180/108  - Neuro check every 1 hour  - No AC or antipletelt for 24 hours  - CT head after 24 hours  - Start ASA 81 and Plavix 75 if CT head shows no bleed  - MRI brain w/o   - Needs TTE, VIRGIL and ILR   - Cardiac monitoring  - TSH, A1C and LDL  77 y.o. 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 CTP showed perfusion deficit on left cerebellum CTA head and neck showed occlusion of the left vertebral artery.   vertebral artery     - Post TNK plan   - SBP<180/108  - Neuro check every 1 hour  - No AC or antiplatelet for 24 hours  - CT head after 24 hours  - Start ASA 81 and Plavix 75 if CT head shows no bleed  - MRI brain w/o   - Needs TTE, VIRGIL and ILR   - Cardiac monitoring  - TSH, A1C and LDL

## 2023-02-17 NOTE — ED PROVIDER NOTE - PHYSICAL EXAMINATION
pt in NAD, AAOx3, head NC/AT, CN II-XII intact, PEERL, EOMi, neck (-) midline tenderness, lungs CTA B/L, CV S1S2 regular, abdomen soft/NT/ND/(+)BS, ext (-) edema, motor 5/5x4, sensation intact

## 2023-02-17 NOTE — ED PROVIDER NOTE - CLINICAL SUMMARY MEDICAL DECISION MAKING FREE TEXT BOX
Patient presents to the emergency department with acute dizziness.  Stroke considered.  Neuroimaging shows embarrassment of the posterior circulation consistent with stroke.  Patient received thrombolytic therapy.  Patient will be admitted to the ICU.

## 2023-02-17 NOTE — PHYSICAL THERAPY INITIAL EVALUATION ADULT - SPECIFY REASON(S)
pt is receiving TPA at the moment and is not appropriate for any interventions.  Pt will be placed on hold until appropriate

## 2023-02-17 NOTE — OCCUPATIONAL THERAPY INITIAL EVALUATION ADULT - SPECIFY REASON(S)
Chart reviewed. As discussed with GARRY Ivy, hold bedside OT IE at this time 2/2 pt currently bedrest orders s/p TNK. OT to f/u when appropriate; RN aware.

## 2023-02-17 NOTE — ED ADULT TRIAGE NOTE - CHIEF COMPLAINT QUOTE
BIBA from home as per EMS pt had woken up felt fine, about 1 hr pta, pt had fallen with dizziness and nausea. Denies LOC and not on a blood thinner

## 2023-02-17 NOTE — SWALLOW BEDSIDE ASSESSMENT ADULT - COMMENTS
Pt received TNK at 7:27am. NPO for 12 hours post TNK.    Not able to assess swallow function at this time d/t NPO status post TNK. Family denies baseline dysphagia. Per discussion w/ RN, pt passed bedside dysphagia screening this AM.     Recommend repeat RN dysphagia screen post TNK NPO status. SLP services to f/u on 2/19.

## 2023-02-17 NOTE — H&P ADULT - ASSESSMENT
77 y.o. 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 Pt states he woke up feeling well, went downstairs and when returning up the stairs  at about 5:10am  all of a sudden developed dizziness, nausea, diaphoresis and weakness. Pt states he was unable to walk due to symptoms  and fell.     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 and TNK was given at 7:27am    dizziness s/p TNK     - admit to ICU   - post thrombolytic protocol   - check repeat CT head ~ 7:27 tomorrow morning or STAT for any change in mental status   - 2DECHO   - Lipitor   - will need MRI brain   - neurology consult   - sequentials for DVT prophylaxis

## 2023-02-17 NOTE — CONSULT NOTE ADULT - SUBJECTIVE AND OBJECTIVE BOX
Neurology  Consult Note  02-17-23    Name:  NEY HANKS; 77y (1945)    Reason for Admission: Cerebral infarction        Chief Complaint:  HPI:  77 y.o. 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 Pt states he woke up feeling well, went downstairs and when returning up the stairs  at about 5:10am  all of a sudden developed dizziness, nausea, diaphoresis and weakness. Pt states he was unable to walk due to symptoms  and fell.     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 and TNK was given at 7:27am (17 Feb 2023 09:55)    Review of Systems:  As states in HPI.        PMHx:   HTN (hypertension)    History of BPH    Stroke    Diverticulitis      PFHx:   No pertinent family history in first degree relatives      PSuHx:   No significant past surgical history      PSoHx:     Marital Status:  (   )    (   ) Single    (   )    (  )   Occupation:   Lives with: (  ) alone  (  ) children   (  ) spouse   (  ) parents  (  ) other  Recent Travel:     Substance Use (street drugs): (  ) never used  (  ) other:  Tobacco Usage:  (   ) never smoked   (   ) former smoker   (   ) current smoker  (     ) pack year  Alcohol Usage:  Sexual History:         Medications:  MEDICATIONS  (STANDING):  amitriptyline 10 milliGRAM(s) Oral at bedtime  atorvastatin 80 milliGRAM(s) Oral at bedtime  dextrose 5% + sodium chloride 0.45%. 1000 milliLiter(s) (75 mL/Hr) IV Continuous <Continuous>  finasteride 5 milliGRAM(s) Oral daily  latanoprost 0.005% Ophthalmic Solution 1 Drop(s) Both EYES at bedtime  pantoprazole    Tablet 40 milliGRAM(s) Oral before breakfast    MEDICATIONS  (PRN):    Vitals:  T(C): 35.7 (02-17-23 @ 06:20), Max: 35.7 (02-17-23 @ 06:18)  HR: 82 (02-17-23 @ 14:00) (66 - 87)  BP: 153/74 (02-17-23 @ 14:00) (145/70 - 186/86)  RR: 16 (02-17-23 @ 14:00) (16 - 20)  SpO2: 97% (02-17-23 @ 14:00) (96% - 100%)    Labs:                        14.5   10.93 )-----------( 215      ( 17 Feb 2023 06:22 )             43.8     02-17    143  |  106  |  16  ----------------------------<  190<H>  3.7   |  26  |  1.1    Ca    9.1      17 Feb 2023 06:22    TPro  6.7  /  Alb  4.4  /  TBili  0.5  /  DBili  x   /  AST  21  /  ALT  22  /  AlkPhos  80  02-17    CAPILLARY BLOOD GLUCOSE  149 (17 Feb 2023 06:20)        LIVER FUNCTIONS - ( 17 Feb 2023 06:22 )  Alb: 4.4 g/dL / Pro: 6.7 g/dL / ALK PHOS: 80 U/L / ALT: 22 U/L / AST: 21 U/L / GGT: x             PT/INR - ( 17 Feb 2023 06:22 )   PT: 11.80 sec;   INR: 1.03 ratio         PTT - ( 17 Feb 2023 06:22 )  PTT:29.3 sec  CSF:                      PHYSICAL EXAMINATION:  General: Well-developed, well nourished, in no acute distress.  Eyes: Conjunctiva and sclera clear.  Neurologic:  - Mental Status:  Alert, awake, oriented to person, place, and time; Speech is fluent with intact naming, repetition, and comprehension; Good overall fund of knowledge; Immediate recall is 3/3 words and delayed recall is 3/3 words at 5 minutes; Able to spell WORLD backwards and perform serial 7 subtraction; Able to read and write a sentence.  - Cranial Nerves II-XII:    II:  Visual fields are full to confrontation; Pupils are equal, round, and reactive to light; Visual acuity is 20/20 bilaterally; Fundoscopic exam is normal with sharp discs.  III, IV, VI:  Extraocular movements are intact without nystagmus.  V:  Facial sensation is intact in the V1-V3 distribution bilaterally.  VII:  Face is symmetric with normal eye closure and smile  VIII:  Hearing is intact to finger rub.  IX, X:  Uvula is midline and soft palate rises symmetrically  XI:  Head turning and shoulder shrug are intact.  XII:  Tongue protudes in the midline.  - Motor:  Strength is 5/5 throughout.  There is no pronator drift.  Normal muscle bulk and tone throughout.  - Reflexes:  2+ and symmetric at the biceps, triceps, brachioradialis, knees, and ankles.  Plantar responses flexor.  - Sensory:  Intact throughout to vibration, and joint-position, light touch, pin prick.  - Coordination:  Finger-nose-finger and heel-knee-shin intact without dysmetria.  Rapid alternating hand movements intact.  - Gait:   Normal steps, base, arm swing, and turning.  Heel and toe walking are normal.  Tandem gait is normal.  Romberg testing is negative.    Radiology:      Impression:      Plan:    Legend:  [] Uncomplete task.  [P] Ordered and pending task.  [R] Imaging done, pending read.    [x] Completed task. Neurology  Consult Note  02-17-23    Name:  NEY HANKS; 77y (1945)    Reason for Admission: Cerebral infarction        Chief Complaint:  HPI:  77 y.o. 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 Pt states he woke up feeling well, went downstairs and when returning up the stairs  at about 5:10am  all of a sudden developed dizziness, nausea, diaphoresis and weakness. Pt states he was unable to walk due to symptoms  and fell.     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 and TNK was given at 7:27am (17 Feb 2023 09:55)    Review of Systems:  As states in HPI.        PMHx:   HTN (hypertension)    History of BPH    Stroke    Diverticulitis      PFHx:   No pertinent family history in first degree relatives      PSuHx:   No significant past surgical history      PSoHx:     Marital Status:  (   )    (   ) Single    (   )    (  )   Occupation:   Lives with: (  ) alone  (  ) children   (  ) spouse   (  ) parents  (  ) other  Recent Travel:     Substance Use (street drugs): (  ) never used  (  ) other:  Tobacco Usage:  (   ) never smoked   (   ) former smoker   (   ) current smoker  (     ) pack year  Alcohol Usage:  Sexual History:         Medications:  MEDICATIONS  (STANDING):  amitriptyline 10 milliGRAM(s) Oral at bedtime  atorvastatin 80 milliGRAM(s) Oral at bedtime  dextrose 5% + sodium chloride 0.45%. 1000 milliLiter(s) (75 mL/Hr) IV Continuous <Continuous>  finasteride 5 milliGRAM(s) Oral daily  latanoprost 0.005% Ophthalmic Solution 1 Drop(s) Both EYES at bedtime  pantoprazole    Tablet 40 milliGRAM(s) Oral before breakfast    MEDICATIONS  (PRN):    Vitals:  T(C): 35.7 (02-17-23 @ 06:20), Max: 35.7 (02-17-23 @ 06:18)  HR: 82 (02-17-23 @ 14:00) (66 - 87)  BP: 153/74 (02-17-23 @ 14:00) (145/70 - 186/86)  RR: 16 (02-17-23 @ 14:00) (16 - 20)  SpO2: 97% (02-17-23 @ 14:00) (96% - 100%)    Labs:                        14.5   10.93 )-----------( 215      ( 17 Feb 2023 06:22 )             43.8     02-17    143  |  106  |  16  ----------------------------<  190<H>  3.7   |  26  |  1.1    Ca    9.1      17 Feb 2023 06:22    TPro  6.7  /  Alb  4.4  /  TBili  0.5  /  DBili  x   /  AST  21  /  ALT  22  /  AlkPhos  80  02-17    CAPILLARY BLOOD GLUCOSE  149 (17 Feb 2023 06:20)        LIVER FUNCTIONS - ( 17 Feb 2023 06:22 )  Alb: 4.4 g/dL / Pro: 6.7 g/dL / ALK PHOS: 80 U/L / ALT: 22 U/L / AST: 21 U/L / GGT: x             PT/INR - ( 17 Feb 2023 06:22 )   PT: 11.80 sec;   INR: 1.03 ratio         PTT - ( 17 Feb 2023 06:22 )  PTT:29.3 sec  CSF:              PHYSICAL EXAMINATION:  Alert and oriented x3   No dysarthria or aphasia   CN intact, PERRL. Visual field: poor vision in the left eye,. Right eye intact   Motor and sensory intact   No dysmetria     NIH score: 0     Findings likely representing ischemic penumbra in the left cerebellar   hemisphere.    CTA HEAD/NECK:    In comparison with the MR a of the brain dated August 12, 2022 and CTA of   the head and neck dated May 6, 2021:    Interval development of findings consistent with occlusion of the left   vertebral artery at the level of the skull base with reconstitution of   its distal most aspect likely due to retrograde filling.

## 2023-02-17 NOTE — ED ADULT NURSE REASSESSMENT NOTE - NS ED NURSE REASSESS COMMENT FT1
pt educated on the need for NPO status s/p TNK adminstration   pt and family noncompliant with NPO status   family at bedside giving pt water  pt and family reeducated on need for NPO status, continues to be noncompliant

## 2023-02-17 NOTE — ED PROVIDER NOTE - PROGRESS NOTE DETAILS
Pt signed out to Dr. Orta pending work up and dispo. You: CT n/c negative per radiologist.  telestroke eval underway. Pain Case discussed with telestroke neurologist.  Thrombolytic therapy advised.  The telestroke neurologist discussed the risk and benefits of thrombolytic therapy with the family and the patient and they are agreeable to receiving the medication.  I repeated this risk-benefit conversation with the patient and the family. TNK given.  The dose of thrombolytics administered was confirmed.  The patient has no history of intracranial hemorrhage or evidence of intracranial hemorrhage on CT.  The following was verified: the patient has not taken a direct-acting oral anticoagulant (DOAC) in past 48 hours OR if one has been taken between 3 and 48 hours, coagulation studies been confirmed, and all results are normal.  If patient is on warfarin, the INR < 1.7.  The patient’s last known well time is within 4.5 hours.  The risks, benefits, alternatives, and potential complications of thrombolytics (including bleeding in the brain or other parts of the body, allergic reaction, and/or even death) were discussed. We believe the benefits of IV thrombolytics outweigh the risks of the drug.  Through shared decision making, the patient and family expressed understanding and agree with plan to treat potential stroke with thrombolytics. Patient and family informed of potential for stroke mimics.  An opportunity to ask questions and have them answered was provided. RK: received signout for NAUN Samson, pending CT reads and neuro disposition. I was at bedside while tele-neurologist was evaluating pt. neurologist discussed risk and benefits of TNK with pt; TNK given, pt approved for ICU by Dr. Brooks.

## 2023-02-17 NOTE — STROKE CODE NOTE - ASSESSMENT/PLAN
77 year old man w/ PMH of HTN, ?l. moncular vision loss (5/2022, no tpa, MRI negative, on ASA), presents w/ acute onset room-spinning dizziness. Patient woke up in usual state of health shortly before 5am. At 0510, he developed sudden onset, room-spinning dizziness, and was no longer able to walk a single step. Symptoms have been persistent since. Denies any weakness, sensory symptoms, vision changes, or speech difficulties. on ASA but not AC.     Code stroke called  I examined the patient via [facility]  LKN 0510 2/17/23  NIHSS 0 (but couldn't sit up or stand up to take a single step due to symptoms)   MRS 0   CTH (to my eye) no acute pathology   CTA h/n demonstrates critical stenosis/occlusion of the L. V4 segment; Focal mild-moderate mid-basilar stenosis.   CTP with Tmax >6s delay in the L. cerebellar hemisphere, possibly PICA territory     Risks and benefits of TNK discussed with the patient and family. I explained to them that the patient has a low NIHSS 0 but I would deem this defecit to be disabling. The patient and family also agreed that this defecit is disabling to the patient and would interfere with quality of life if persists. Risks of TNK, including the risk of systemic hemorrhage, intracranial hemorrhag,e and death were explained and accepted.     No MT as the basilar artery was patent    Imp: Acute vestibular syndrome likely localizing to the posterior fossa likely due to acute ischemic stroke secondary to intracranial atherosclerotic disease.     s/p TNK   TNK protocol   Defer rest of management to SIS team

## 2023-02-17 NOTE — PATIENT PROFILE ADULT - FALL HARM RISK - HARM RISK INTERVENTIONS

## 2023-02-17 NOTE — ED ADULT NURSE REASSESSMENT NOTE - NS ED NURSE REASSESS COMMENT FT1
received pt in no acute distress   pt was a stroke code due to dizziness, pt denies dizziness upon assessment   pt has baseline vision loss in left eye due to previous stroke, NIH 1   pt to be admitted to icu s/p TNK administration   pt awaiting bed placement   pt on continuous cardiac monitoring, NIH monitored as per protocol   safety maintained

## 2023-02-17 NOTE — ED PROVIDER NOTE - FAMILY DETAILS FREE TEXT FOR MDM ADDL HISTORY OBTAINED FROM QUESTION
HPI details, results of diagnostic testing, the use of thrombolytic therapy, disposition decision making discussed with family.

## 2023-02-17 NOTE — ED ADULT NURSE REASSESSMENT NOTE - NS ED NURSE REASSESS COMMENT FT1
Pt back from Ct scan, pt being non cooperative during exam. Pt told neurologist on video "I am not fucking speaking to you, I do not want to do this now"

## 2023-02-17 NOTE — PATIENT PROFILE ADULT - CENTRAL VENOUS CATHETER/PICC LINE

## 2023-02-17 NOTE — ED PROVIDER NOTE - OBJECTIVE STATEMENT
77 y.o. 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 Pt states he woke up feeling well, went downstairs at about 5:10am and all of a sudden developed dizziness, nausea, and weakness. Pt states he was unable to walk due to dizziness and fell. No head trauma. No LOC. No CP/SOB, abdominal pain. No urinary symptoms. No leg pain or swelling. No slurred speech.

## 2023-02-17 NOTE — H&P ADULT - NSHPLABSRESULTS_GEN_ALL_CORE
LABS  02-17    143  |  106  |  16  ----------------------------<  190<H>  3.7   |  26  |  1.1    Ca    9.1      17 Feb 2023 06:22    TPro  6.7  /  Alb  4.4  /  TBili  0.5  /  DBili  x   /  AST  21  /  ALT  22  /  AlkPhos  80  02-17                          14.5   10.93 )-----------( 215      ( 17 Feb 2023 06:22 )             43.8       PT/INR - ( 17 Feb 2023 06:22 )   PT: 11.80 sec;   INR: 1.03 ratio         PTT - ( 17 Feb 2023 06:22 )  PTT:29.3 sec    CARDIAC ENZYMES  Troponin T, Serum (02.17.23 @ 06:22)   Troponin T, Serum: <0.01 ng/mL     < from: 12 Lead ECG (02.17.23 @ 06:59) >    Diagnosis Line Sinus rhythm [id number] degree A-V block    < end of copied text >    < from: CT Brain Stroke Protocol (02.17.23 @ 07:02) >    IMPRESSION:    No CT evidence for acute intracranial pathology.    < end of copied text >    < from: CT Brain Perfusion Maps Stroke (02.17.23 @ 07:02) >    IMPRESSION:    CT PERFUSION:    Findings likely representing ischemic penumbra in the left cerebellar   hemisphere.    CTA HEAD/NECK:    In comparison with the MR a of the brain dated August 12, 2022 and CTA of   the head and neck dated May 6, 2021:    Interval development of findings consistent with occlusion of the left   vertebral artery at the level of the skull base with reconstitution of   its distal most aspect likely due to retrograde filling.      < end of copied text >

## 2023-02-17 NOTE — H&P ADULT - HISTORY OF PRESENT ILLNESS
77 y.o. 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 Pt states he woke up feeling well, went downstairs and when returning up the stairs  at about 5:10am  all of a sudden developed dizziness, nausea, diaphoresis and weakness. Pt states he was unable to walk due to symptoms  and fell.     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

## 2023-02-17 NOTE — H&P ADULT - NSHPPHYSICALEXAM_GEN_ALL_CORE
PHYSICAL EXAM:  Vital Signs Last 24 Hrs  T(C): 35.7 (17 Feb 2023 06:20), Max: 35.7 (17 Feb 2023 06:18)  T(F): 96.2 (17 Feb 2023 06:20), Max: 96.2 (17 Feb 2023 06:18)  HR: 82 (17 Feb 2023 10:30) (66 - 82)  BP: 186/86 (17 Feb 2023 10:30) (160/74 - 186/86)  RR: 20 (17 Feb 2023 10:30) (20 - 20)  SpO2: 100% (17 Feb 2023 10:30) (98% - 100%)    Parameters below as of 17 Feb 2023 09:30  Patient On (Oxygen Delivery Method): room air      GENERAL: NAD, well-groomed, well-developed  HEAD:  Atraumatic, Normocephalic  EYES: EOMI, PERRLA, conjunctiva and sclera clear  ENMT: No tonsillar erythema, exudates, or enlargement; Moist mucous membranes, Good dentition, No lesions  NECK: Supple, No JVD, Normal thyroid  NERVOUS SYSTEM:  Alert & Oriented X3, no motor deficit, L lateral visual field deficit  CHEST/LUNG: Clear to percussion bilaterally; No rales, rhonchi, wheezing, or rubs  HEART: Regular rate and rhythm; No murmurs, rubs, or gallops  ABDOMEN: Soft, Nontender, Nondistended; Bowel sounds present  EXTREMITIES:  2+ Peripheral Pulses, No clubbing, cyanosis, or edema  LYMPH: No lymphadenopathy noted  SKIN: No rashes or lesions

## 2023-02-17 NOTE — PATIENT PROFILE ADULT - VISION (WITH CORRECTIVE LENSES IF THE PATIENT USUALLY WEARS THEM):
Left-side of left eye blindness/Partially impaired: cannot see medication labels or newsprint, but can see obstacles in path, and the surrounding layout; can count fingers at arm's length

## 2023-02-17 NOTE — ED ADULT NURSE NOTE - NS_SISCREENINGSR_GEN_ALL_ED
 senna (SENOKOT) tablet 8.6 mg  1 tablet Oral Daily PRN Jaja MIMS Vincent, DO   8.6 mg at 12/09/20 1715    aspirin EC tablet 325 mg  325 mg Oral Daily Jaja MIMS Vincent, DO   325 mg at 12/14/20 0854    atorvastatin (LIPITOR) tablet 20 mg  20 mg Oral Daily Jaja MIMS Vincent, DO   20 mg at 12/14/20 2047    carvedilol (COREG) tablet 6.25 mg  6.25 mg Oral BID WC Jaja MIMS Vincent, DO   6.25 mg at 12/14/20 1653    clopidogrel (PLAVIX) tablet 75 mg  75 mg Oral Daily Jaja MIMS Vincent, DO   75 mg at 12/14/20 0854    isosorbide mononitrate (IMDUR) extended release tablet 60 mg  60 mg Oral Daily Jaja MIMS Vincent, DO   60 mg at 12/14/20 0854    levothyroxine (SYNTHROID) tablet 150 mcg  150 mcg Oral Daily Jaja MIMS Vincent, DO   150 mcg at 12/15/20 0602    montelukast (SINGULAIR) tablet 10 mg  10 mg Oral Nightly Jaja MIMS Vincent, DO   10 mg at 12/14/20 2047    oxyCODONE-acetaminophen (PERCOCET) 5-325 MG per tablet 1 tablet  1 tablet Oral Q4H PRN Jaja MIMS Vincent, DO        pantoprazole (PROTONIX) tablet 40 mg  40 mg Oral QAM AC Jaja MIMS Vincent, DO   40 mg at 12/15/20 0602    polyvinyl alcohol (LIQUIFILM TEARS) 1.4 % ophthalmic solution 1 drop  1 drop Left Eye PRN Jaja MIMS Vincent, DO        sertraline (ZOLOFT) tablet 75 mg  75 mg Oral Daily Jaja MIMS Vincent, DO   75 mg at 12/14/20 0854    albuterol-ipratropium (COMBIVENT RESPIMAT)  MCG/ACT inhaler 1 puff  1 puff Inhalation Q4H PRN Jaja Desouzaiter, DO        enoxaparin (LOVENOX) injection 40 mg  40 mg Subcutaneous BID Jaja MIMS Vincent, DO   40 mg at 12/14/20 2047    0.9 % sodium chloride bolus  30 mL Intravenous PRN Jaja MIMS Vincent, DO        glucose (GLUTOSE) 40 % oral gel 15 g  15 g Oral PRN Jaja MIMS Vincent, DO        dextrose 50 % IV solution  12.5 g Intravenous PRN Jaja MIMS Vincent, DO        glucagon (rDNA) injection 1 mg  1 mg Intramuscular PRN Jaja MIMS Vincent, DO        dextrose 5 % solution  100 mL/hr Intravenous PRN Jaja MIMS Vincent, DO  miconazole (MICOTIN) 2 % powder   Topical BID Jaja Kaur DO   Given at 12/15/20 0617    budesonide-formoterol (SYMBICORT) 160-4.5 MCG/ACT inhaler 2 puff  2 puff Inhalation BID Tayla Jefferson MD   2 puff at 12/14/20 2047    albuterol sulfate  (90 Base) MCG/ACT inhaler 2 puff  2 puff Inhalation 4x daily Tayla Jefferson MD   2 puff at 12/14/20 2046    vitamin C (ASCORBIC ACID) tablet 1,000 mg  1,000 mg Oral BID Tayla Jefferson MD   1,000 mg at 12/14/20 2047    zinc sulfate (ZINCATE) capsule 50 mg  50 mg Oral Daily Tayla Jefferson MD   50 mg at 12/14/20 0854       PRN Medications  magnesium hydroxide, sodium chloride flush, acetaminophen **OR** acetaminophen, senna, oxyCODONE-acetaminophen, polyvinyl alcohol, albuterol-ipratropium, sodium chloride, glucose, dextrose, glucagon (rDNA), dextrose    Objective  Most Recent Recorded Vitals  /61   Pulse (!) 42   Temp 97.4 °F (36.3 °C) (Oral)   Resp 16   Ht 5' 7\" (1.702 m)   Wt 257 lb (116.6 kg)   SpO2 97%   BMI 40.25 kg/m²   I/O last 3 completed shifts:  In: -   Out: 450 [Urine:450]  I/O this shift:  In: -   Out: 250 [Urine:250]    Physical Exam:  General: AAO to person/place/time/purpose, NAD, no labored breathing  Eyes: conjunctivae/corneas clear, sclera non icteric  Skin: color/texture/turgor normal, no rashes or lesions, protuberance to side of head  Lungs: CTAB with diminished bases, 4L NC, no retractions/use of accessory muscles, no vocal fremitus, no rhonchi, no crackle, no rales  Heart: bradycardic rate, regular rhythm, no murmur  Abdomen: soft, NT; bowel sounds normal; no masses,  no organomegaly  Extremities: atraumatic, no cyanosis, no edema  Neurologic: cranial nerves 2-12 grossly intact, no slurred speech.     Most Recent Labs  Lab Results   Component Value Date    WBC 11.7 (H) 12/15/2020    HGB 11.5 (L) 12/15/2020    HCT 35.6 (L) 12/15/2020     12/15/2020     12/15/2020    K 5.2 (H) 12/15/2020     12/15/2020 CREATININE 1.0 12/15/2020    BUN 41 (H) 12/15/2020    CO2 20 (L) 12/15/2020    GLUCOSE 230 (H) 12/15/2020    ALT 75 (H) 12/15/2020    AST 29 12/15/2020    INR 1.0 12/15/2020    TSH 12.220 (H) 03/25/2015    LABA1C 12.1 (H) 02/05/2015       XR CHEST PORTABLE   Final Result   Stable abnormal chest with diffuse infiltrates with marginal improvement   likely pneumonia. XR CHEST PORTABLE   Final Result   Diffuse bilateral pneumonia. Assessment   Active Hospital Problems    Diagnosis    Pneumonia due to COVID-19 virus [U07.1, J12.89]     Priority: High    Acute respiratory failure with hypoxia ([facility]) [J96.01]     Priority: Medium    Bilateral carotid artery stenosis [I65.23]    History of stroke [Z86.73]    Bell's palsy [G51.0]    Facial droop [R29.810]    ASHD (arteriosclerotic heart disease) [I25.10]    COPD (chronic obstructive pulmonary disease) ([facility]) [J44.9]    Tobacco abuse [Z72.0]    CAD (coronary artery disease) [I25.10]    Diabetes mellitus ([facility] [E11.9]    Hyperlipidemia [E78.5]    DEMARCUS (obstructive sleep apnea) [G47.33]         Plan  · COVID-19 viral pneumonia w/ associated acute hypoxic respiratory failure: Contact/droplet isolation. sp Remdesivir-- 5 days complete. Decadron weaning. Noemy Iftikhar as able. Albuterol aerosols. Tylenol prn fevers or myalgias. Self proning as able. BID Lovenox. Vitamin C/thiamine/Zinc/VitD. · Bradycardia: Continue to hold Coreg. Monitor. · PT/OT  · Follow labs   · DVT prophylaxis  · Please see orders for further management and care. · Discharge plan: back to SNF soon-not today due to bradycardia    I have seen and examined this patient independently. The case has been discussed and the patient will be physically seen by Dr. Gretchen Chamberlain as well. All interventions have been agreed upon. LEONA Deleon, CECILIAP-C  12/15/2020  9:15 AM    I can be reached through [facility].     Addendum: I have personally participated in a face-to-face history and physical exam on the date of service with the patient. I have discussed the case with the nurse practitioner. I also participated in medical decision making on the date of service and I agree with all of the pertinent clinical information unless indicated in my editing of the note. I have reviewed and edited the note above based on my findings during my history, exam, and decision making on the same day of service. My additional thoughts:    Discontinue Coreg-cardiology consultation if still bradycardic tomorrow   Hold discharge    Electronically signed by Alice Guerrero DO on 12/15/2020 at 1:21 PM    I can be reached through [employer]. Negative

## 2023-02-17 NOTE — ED ADULT NURSE NOTE - INTERVENTIONS DEFINITIONS
Call bell, personal items and telephone within reach/Physically safe environment: no spills, clutter or unnecessary equipment/Stretcher in lowest position, wheels locked, appropriate side rails in place/Review medications for side effects contributing to fall risk

## 2023-02-17 NOTE — SWALLOW BEDSIDE ASSESSMENT ADULT - SLP PERTINENT HISTORY OF CURRENT PROBLEM
77 y.o. 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 Pt states he woke up feeling well, went downstairs and when returning up the stairs  at about 5:10am  all of a sudden developed dizziness, nausea, diaphoresis and weakness. Pt states he was unable to walk due to symptoms and fell. 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

## 2023-02-18 ENCOUNTER — TRANSCRIPTION ENCOUNTER (OUTPATIENT)
Age: 78
End: 2023-02-18

## 2023-02-18 VITALS — RESPIRATION RATE: 28 BRPM | SYSTOLIC BLOOD PRESSURE: 144 MMHG | HEART RATE: 76 BPM | DIASTOLIC BLOOD PRESSURE: 75 MMHG

## 2023-02-18 LAB
A1C WITH ESTIMATED AVERAGE GLUCOSE RESULT: 6.4 % — HIGH (ref 4–5.6)
ANION GAP SERPL CALC-SCNC: 9 MMOL/L — SIGNIFICANT CHANGE UP (ref 7–14)
BUN SERPL-MCNC: 13 MG/DL — SIGNIFICANT CHANGE UP (ref 10–20)
CALCIUM SERPL-MCNC: 8.8 MG/DL — SIGNIFICANT CHANGE UP (ref 8.4–10.5)
CHLORIDE SERPL-SCNC: 105 MMOL/L — SIGNIFICANT CHANGE UP (ref 98–110)
CHOLEST SERPL-MCNC: 126 MG/DL — SIGNIFICANT CHANGE UP
CO2 SERPL-SCNC: 25 MMOL/L — SIGNIFICANT CHANGE UP (ref 17–32)
CREAT SERPL-MCNC: 1 MG/DL — SIGNIFICANT CHANGE UP (ref 0.7–1.5)
EGFR: 77 ML/MIN/1.73M2 — SIGNIFICANT CHANGE UP
ESTIMATED AVERAGE GLUCOSE: 137 MG/DL — HIGH (ref 68–114)
GLUCOSE SERPL-MCNC: 104 MG/DL — HIGH (ref 70–99)
HCT VFR BLD CALC: 43.3 % — SIGNIFICANT CHANGE UP (ref 42–52)
HDLC SERPL-MCNC: 59 MG/DL — SIGNIFICANT CHANGE UP
HGB BLD-MCNC: 14.3 G/DL — SIGNIFICANT CHANGE UP (ref 14–18)
LIPID PNL WITH DIRECT LDL SERPL: 44 MG/DL — SIGNIFICANT CHANGE UP
MCHC RBC-ENTMCNC: 28.8 PG — SIGNIFICANT CHANGE UP (ref 27–31)
MCHC RBC-ENTMCNC: 33 G/DL — SIGNIFICANT CHANGE UP (ref 32–37)
MCV RBC AUTO: 87.1 FL — SIGNIFICANT CHANGE UP (ref 80–94)
NON HDL CHOLESTEROL: 67 MG/DL — SIGNIFICANT CHANGE UP
NRBC # BLD: 0 /100 WBCS — SIGNIFICANT CHANGE UP (ref 0–0)
PLATELET # BLD AUTO: 234 K/UL — SIGNIFICANT CHANGE UP (ref 130–400)
POTASSIUM SERPL-MCNC: 4 MMOL/L — SIGNIFICANT CHANGE UP (ref 3.5–5)
POTASSIUM SERPL-SCNC: 4 MMOL/L — SIGNIFICANT CHANGE UP (ref 3.5–5)
RBC # BLD: 4.97 M/UL — SIGNIFICANT CHANGE UP (ref 4.7–6.1)
RBC # FLD: 13.7 % — SIGNIFICANT CHANGE UP (ref 11.5–14.5)
SODIUM SERPL-SCNC: 139 MMOL/L — SIGNIFICANT CHANGE UP (ref 135–146)
TRIGL SERPL-MCNC: 117 MG/DL — SIGNIFICANT CHANGE UP
TSH SERPL-MCNC: 1.57 UIU/ML — SIGNIFICANT CHANGE UP (ref 0.27–4.2)
WBC # BLD: 9.37 K/UL — SIGNIFICANT CHANGE UP (ref 4.8–10.8)
WBC # FLD AUTO: 9.37 K/UL — SIGNIFICANT CHANGE UP (ref 4.8–10.8)

## 2023-02-18 PROCEDURE — 99291 CRITICAL CARE FIRST HOUR: CPT

## 2023-02-18 PROCEDURE — 99233 SBSQ HOSP IP/OBS HIGH 50: CPT

## 2023-02-18 PROCEDURE — 70450 CT HEAD/BRAIN W/O DYE: CPT | Mod: 26

## 2023-02-18 PROCEDURE — 99232 SBSQ HOSP IP/OBS MODERATE 35: CPT | Mod: GC

## 2023-02-18 RX ORDER — CLOPIDOGREL BISULFATE 75 MG/1
75 TABLET, FILM COATED ORAL DAILY
Refills: 0 | Status: DISCONTINUED | OUTPATIENT
Start: 2023-02-18 | End: 2023-02-18

## 2023-02-18 RX ORDER — CLOPIDOGREL BISULFATE 75 MG/1
1 TABLET, FILM COATED ORAL
Qty: 30 | Refills: 0
Start: 2023-02-18 | End: 2023-03-19

## 2023-02-18 RX ORDER — ASPIRIN/CALCIUM CARB/MAGNESIUM 324 MG
81 TABLET ORAL DAILY
Refills: 0 | Status: DISCONTINUED | OUTPATIENT
Start: 2023-02-18 | End: 2023-02-18

## 2023-02-18 RX ADMIN — PANTOPRAZOLE SODIUM 40 MILLIGRAM(S): 20 TABLET, DELAYED RELEASE ORAL at 06:21

## 2023-02-18 RX ADMIN — FINASTERIDE 5 MILLIGRAM(S): 5 TABLET, FILM COATED ORAL at 14:34

## 2023-02-18 NOTE — PROGRESS NOTE ADULT - ASSESSMENT
Impression:  CVA     Neuro Plan:  No antiplatelets or heparinoids for 24 hours from TNK administration.    Keep SBP < 180.    Head CT at 24 hours out from TNK completion or sooner if acute neuro changes. This AM    Rehab evaluation, PT, OT, and speech evals.    neuro f/u    TTE.   MRI brain and MRA head w/o contrast and MRA neck with contrast after patient out of CCU.   Lipid panel and treat hyperlipidemia with statin.    MICU Q1H neuro checks  
77 y.o. 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 CTP showed perfusion deficit on left cerebellum CTA head and neck showed occlusion of the left vertebral artery.   vertebral artery. He received TNK on 2/18. Repeat CT head after 24 hours showed no mass effect or hemorrhage but showed  new patchy hypoattenuation within the left cerebellar hemisphere. TTE showed normal LA.   Discussed with patient and family in details the necessity of staying in the hospital for Brain MRI and EP consult for MCOT and loop recorder but patient wants to leave home and get all the work up in outpatient setting. Risk of leaving AMA discussed with patient but he stated that he made up his mind.       - Start ASA 81 and Plavix 75 mg   - Continue Lipitor 80   - Resume home blood pressure medication  - Outpatient follow up with stroke clinic in 1 week   - Outpatient follow up with EP for ILR 
77 y.o. 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 Pt states he woke up feeling well, went downstairs and when returning up the stairs  at about 5:10am  all of a sudden developed dizziness, nausea, diaphoresis and weakness. Pt states he was unable to walk due to symptoms  and fell.   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 Physiatry PT/OT eval.   - Code Status: DNR/DNI    Dr. Shashi Noyola  Attending Hospitalist

## 2023-02-18 NOTE — CONSULT NOTE ADULT - SUBJECTIVE AND OBJECTIVE BOX
HPI: 77 y.o. 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 Pt states he woke up feeling well, went downstairs and when returning up the stairs  at about 5:10am  all of a sudden developed dizziness, nausea, diaphoresis and weakness. Pt states he was unable to walk due to symptoms  and fell.     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 and TNK was given at 7:27amptn seen and  exam at  bed side  aox3  ptn  wife  and  dtr  at  bed side  visiting    PTN  REFERRED TO ACUTE  REHAB  FOR  EVAL AND  TX   PAST MEDICAL & SURGICAL HISTORY:  HTN (hypertension)      History of BPH      Stroke      Diverticulitis      No significant past surgical history          Hospital Course:    TODAY'S SUBJECTIVE & REVIEW OF SYMPTOMS:     Constitutional WNL   Cardio WNL   Resp WNL   GI WNL  Heme WNL  Endo WNL  Skin WNL  MSK WNL  Neuro WNL  Cognitive WNL  Psych WNL      MEDICATIONS  (STANDING):  amitriptyline 10 milliGRAM(s) Oral at bedtime  atorvastatin 80 milliGRAM(s) Oral at bedtime  dextrose 5% + sodium chloride 0.45%. 1000 milliLiter(s) (75 mL/Hr) IV Continuous <Continuous>  finasteride 5 milliGRAM(s) Oral daily  latanoprost 0.005% Ophthalmic Solution 1 Drop(s) Both EYES at bedtime  pantoprazole    Tablet 40 milliGRAM(s) Oral before breakfast    MEDICATIONS  (PRN):      FAMILY HISTORY:  No pertinent family history in first degree relatives        Allergies    No Known Allergies    Intolerances        SOCIAL HISTORY:    [  ] Etoh  [  ] Smoking  [  ] Substance abuse     Home Environment:  [  ] Home Alone  [ x ] Lives with Family  [  ] Home Health Aid    Dwelling:  [  ] Apartment  [ x ] Private House  [  ] Adult Home  [  ] Skilled Nursing Facility      [  ] Short Term  [  ] Long Term  [  x] Stairs       Elevator [  ]    FUNCTIONAL STATUS PTA: (Check all that apply)  Ambulation: [ x  ]Independent    [  ] Dependent     [  ] Non-Ambulatory  Assistive Device: [  ] SA Cane  [  ]  Q Cane  [  ] Walker  [  ]  Wheelchair  ADL : [  x] Independent  [  ]  Dependent       Vital Signs Last 24 Hrs  T(C): 36 (2023 07:35), Max: 37.1 (2023 17:00)  T(F): 96.8 (2023 07:35), Max: 98.8 (2023 17:00)  HR: 64 (2023 06:00) (61 - 93)  BP: 129/60 (2023 06:00) (112/55 - 186/86)  BP(mean): 87 (2023 06:00) (78 - 119)  RR: 20 (2023 07:35) (16 - 27)  SpO2: 97% (2023 06:00) (95% - 100%)    Parameters below as of 2023 19:08  Patient On (Oxygen Delivery Method): room air          PHYSICAL EXAM: Alert & Oriented X3  GENERAL: NAD, well-groomed, well-developed  HEAD:  Atraumatic, Normocephalic  EYES: EOMI, PERRLA, conjunctiva and sclera clear  NECK: Supple, No JVD, Normal thyroid  CHEST/LUNG: Clear to percussion bilaterally; No rales, rhonchi, wheezing, or rubs  HEART: Regular rate and rhythm; No murmurs, rubs, or gallops  ABDOMEN: Soft, Nontender, Nondistended; Bowel sounds present  EXTREMITIES:  2+ Peripheral Pulses, No clubbing, cyanosis, or edema    NERVOUS SYSTEM:  Cranial Nerves 2-12 intact [x  ] Abnormal  [  ]  ROM: WFL all extremities [x  ]  Abnormal [  ]  Motor Strength: WFL all extremities  [x  ]  Abnormal [  ]  Sensation: intact to light touch [ x ] Abnormal [  ]  Reflexes: Symmetric [  x]  Abnormal [  ]    FUNCTIONAL STATUS:  Bed Mobility: Independent [ x ]  Supervision [  ]  Needs Assistance [  ]  N/A [  ]  Transfers: Independent [x  ]  Supervision [  ]  Needs Assistance [  ]  N/A [  ]   Ambulation: Independent [x  ]  Supervision [  ]  Needs Assistance [  ]  N/A [  ]  ADL: Independent [x  ] Requires Assistance [  ] N/A [  ]  SEE PT/OT IE NOTES    LABS:                        14.5   10. )-----------( 215      ( 2023 06:22 )             43.8     02-    143  |  106  |  16  ----------------------------<  190<H>  3.7   |  26  |  1.1    Ca    9.1      2023 06:22    TPro  6.7  /  Alb  4.4  /  TBili  0.5  /  DBili  x   /  AST  21  /  ALT  22  /  AlkPhos  80  02-17    PT/INR - ( 2023 06:22 )   PT: 11.80 sec;   INR: 1.03 ratio         PTT - ( 2023 06:22 )  PTT:29.3 sec  Urinalysis Basic - ( 2023 09:00 )    Color: Yellow / Appearance: Clear / S.015 / pH: x  Gluc: x / Ketone: Negative  / Bili: Negative / Urobili: 0.2 mg/dL   Blood: x / Protein: Negative mg/dL / Nitrite: Negative   Leuk Esterase: Negative / RBC: x / WBC x   Sq Epi: x / Non Sq Epi: x / Bacteria: x        RADIOLOGY & ADDITIONAL STUDIES:< from: CT Brain Perfusion Maps Stroke (23 @ 07:02) >    CT PERFUSION:    Findings likely representing ischemic penumbra in the left cerebellar   hemisphere.    CTA HEAD/NECK:    In comparison with the MR a of the brain dated 2022 and CTA of   the head and neck dated May 6, 2021:    Interval development of findings consistent with occlusion of the left   vertebral artery at the level of the skull base with reconstitution of   its distal most aspect likely due to retrograde filling.    < end of copied text >      Assesment:

## 2023-02-18 NOTE — DISCHARGE NOTE PROVIDER - NSDCFUADDAPPT_GEN_ALL_CORE_FT
Call [facility] stroke clinic - [phone] to schedule follow up within 2 weeks, Call [phone] to schedule follow up with the electrophysiologist, Call [phone] ([facility] radiology) to schedule an outpatient MRI

## 2023-02-18 NOTE — OCCUPATIONAL THERAPY INITIAL EVALUATION ADULT - GENERAL OBSERVATIONS, REHAB EVAL
10:15-10:38 Chart reviewed, ok to treat by Occupational Therapist as confirmed by RN Mayelin, Pt received seated in recliner chair +tele with wife and daughter present in NAD.

## 2023-02-18 NOTE — PHYSICAL THERAPY INITIAL EVALUATION ADULT - PERTINENT HX OF CURRENT PROBLEM, REHAB EVAL
[phone]
77 y.o. 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 Pt states he woke up feeling well, went downstairs and when returning up the stairs  at about 5:10am  all of a sudden developed dizziness, nausea, diaphoresis and weakness. Pt states he was unable to walk due to symptoms  and fell.     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

## 2023-02-18 NOTE — DISCHARGE NOTE NURSING/CASE MANAGEMENT/SOCIAL WORK - PATIENT PORTAL LINK FT
You can access the FollowMyHealth Patient Portal offered by [facility] by registering at the following website: [email or web address]. By joining [facility]’s FollowMyHealth portal, you will also be able to view your health information using other applications (apps) compatible with our system.

## 2023-02-18 NOTE — DISCHARGE NOTE PROVIDER - CARE PROVIDERS DIRECT ADDRESSES
,DirectAddress_Unknown,[email or web address],DirectAddress_Unknown

## 2023-02-18 NOTE — PHYSICAL THERAPY INITIAL EVALUATION ADULT - ADDITIONAL COMMENTS
Pt lives with wife and daughter in a private house no steps to enter 2 + 10 steps to get to living area with RT  hand rail; . as per PT he was independent with Bed mobility , transfer , ambulation and Stair negotiation PTA

## 2023-02-18 NOTE — OCCUPATIONAL THERAPY INITIAL EVALUATION ADULT - LEVEL OF INDEPENDENCE:TOILET, OT EVAL
[FreeTextEntry1] : Patient does not appear to have hypertension BP is normal there is no evidence of LVH on echo.  All blood pressure also normal now that she is measuring with proper cuff.  She has lost weight.  Her lip consider mildly elevated in the past.  She is asymptomatic. independent

## 2023-02-18 NOTE — PROGRESS NOTE ADULT - SUBJECTIVE AND OBJECTIVE BOX
Neurology Follow up note    Name  NEY HANKS    HPI:  77 y.o. 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 Pt states he woke up feeling well, went downstairs and when returning up the stairs  at about 5:10am  all of a sudden developed dizziness, nausea, diaphoresis and weakness. Pt states he was unable to walk due to symptoms  and fell.     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 and TNK was given at 7:27am (17 Feb 2023 09:55)      Interval History -  Repeat CT head showed left cerebellar hypoattenuation consistent with acute infarct. No hemorrhage of mass effect.   Patient wants to leave AMA.          Vital Signs Last 24 Hrs  T(C): 36 (18 Feb 2023 07:35), Max: 37.1 (17 Feb 2023 17:00)  T(F): 96.8 (18 Feb 2023 07:35), Max: 98.8 (17 Feb 2023 17:00)  HR: 75 (18 Feb 2023 12:00) (61 - 93)  BP: 140/82 (18 Feb 2023 12:00) (112/55 - 166/79)  BP(mean): 114 (18 Feb 2023 08:00) (78 - 119)  RR: 23 (18 Feb 2023 08:00) (16 - 27)  SpO2: 98% (18 Feb 2023 12:00) (95% - 99%)    Parameters below as of 17 Feb 2023 19:08  Patient On (Oxygen Delivery Method): room air      ICU Vital Signs Last 24 Hrs  T(C): 36 (18 Feb 2023 07:35), Max: 37.1 (17 Feb 2023 17:00)  T(F): 96.8 (18 Feb 2023 07:35), Max: 98.8 (17 Feb 2023 17:00)  HR: 75 (18 Feb 2023 12:00) (61 - 93)  BP: 140/82 (18 Feb 2023 12:00) (112/55 - 166/79)  BP(mean): 114 (18 Feb 2023 08:00) (78 - 119)  ABP: --  ABP(mean): --  RR: 23 (18 Feb 2023 08:00) (16 - 27)  SpO2: 98% (18 Feb 2023 12:00) (95% - 99%)    O2 Parameters below as of 17 Feb 2023 19:08  Patient On (Oxygen Delivery Method): room air      Neurological Exam:   Mental status: Awake, alert and oriented x3.  Recent and remote memory intact.  Naming, repetition and comprehension intact.   Cranial nerves: Fundoscopic exam demonstrated no abnormalities, pupils equally round and reactive to light, visual fields full, no nystagmus, extraocular muscles intact, V1 through V3 intact bilaterally and symmetric, face symmetric, hearing intact to finger rub, palate elevation symmetric, tongue was midline, sternocleidomastoid/shoulder shrug strength bilaterally 5/5.    Motor:  Normal bulk and tone, strength 5/5 in bilateral upper and lower extremities.   strength 5/5.  Rapid alternating movements intact and symmetric.   Sensation: Intact to light touch, proprioception, and pinprick.  No neglect.   Coordination: No dysmetria on finger-to-nose and heel-to-shin.  No clumsiness.  Reflexes: 2+ in upper and lower extremities, downgoing toes bilaterally    Medications  amitriptyline 10 milliGRAM(s) Oral at bedtime  aspirin  chewable 81 milliGRAM(s) Oral daily  atorvastatin 80 milliGRAM(s) Oral at bedtime  clopidogrel Tablet 75 milliGRAM(s) Oral daily  finasteride 5 milliGRAM(s) Oral daily  latanoprost 0.005% Ophthalmic Solution 1 Drop(s) Both EYES at bedtime  pantoprazole    Tablet 40 milliGRAM(s) Oral before breakfast      Lab                        14.3   9.37  )-----------( 234      ( 18 Feb 2023 11:44 )             43.3     02-18    139  |  105  |  13  ----------------------------<  104<H>  4.0   |  25  |  1.0    Ca    8.8      18 Feb 2023 11:44    TPro  6.7  /  Alb  4.4  /  TBili  0.5  /  DBili  x   /  AST  21  /  ALT  22  /  AlkPhos  80  02-17    CAPILLARY BLOOD GLUCOSE        LIVER FUNCTIONS - ( 17 Feb 2023 06:22 )  Alb: 4.4 g/dL / Pro: 6.7 g/dL / ALK PHOS: 80 U/L / ALT: 22 U/L / AST: 21 U/L / GGT: x           PT/INR - ( 17 Feb 2023 06:22 )   PT: 11.80 sec;   INR: 1.03 ratio         PTT - ( 17 Feb 2023 06:22 )  PTT:29.3 sec    CT head:     New patchy hypoattenuation within the left cerebellar hemisphere   suspicious for acute ischemic changes. No significant mass effect or   acute intracranial hemorrhage. Further evaluation with brain MRI may be   obtained as clinically warranted.    CTA HEAD/NECK:    In comparison with the MR a of the brain dated August 12, 2022 and CTA of   the head and neck dated May 6, 2021:    Interval development of findings consistent with occlusion of the left   vertebral artery at the level of the skull base with reconstitution of   its distal most aspect likely due to retrograde filling.    Echo:   1. Technically difficult study.   2. Normal left atrial size.   3. There is no evidence of pericardial effusion.   4. Mild mitral valve regurgitation.   5. Structurally normal mitral valve, with normal leaflet excursion.   6. Sclerotic aortic valve with normal opening.   7. There is mild aortic root calcification.
Patient is a 77y old  Male who presents with a chief complaint of stroke (2023 09:05)        Over Night Events:  Patient currently denies any complaints.       ROS:     All pertinent ROS are negative except HPI         PHYSICAL EXAM    ICU Vital Signs Last 24 Hrs  T(C): 36 (2023 07:35), Max: 37.1 (2023 17:00)  T(F): 96.8 (2023 07:35), Max: 98.8 (2023 17:00)  HR: 64 (2023 06:00) (61 - 93)  BP: 129/60 (2023 06:00) (112/55 - 164/87)  BP(mean): 87 (2023 06:00) (78 - 119)  RR: 20 (2023 07:35) (16 - 27)  SpO2: 97% (2023 06:00) (95% - 99%)    O2 Parameters below as of 2023 19:08  Patient On (Oxygen Delivery Method): room air            CONSTITUTIONAL:   Ill appearing. NAD    ENT:   Airway patent,   Mouth with normal mucosa.   No thrush    EYES:   Pupils equal,   Round and reactive to light.    CARDIAC:   Normal rate,   Regular rhythm.    No edema    RESPIRATORY:   No wheezing  Bilateral BS  Normal chest expansion  Not tachypneic,  No use of accessory muscles    GASTROINTESTINAL:  Abdomen soft,   Non-tender,   No guarding,   + BS    MUSCULOSKELETAL:   Range of motion is not limited,  No clubbing, cyanosis    NEUROLOGICAL:   Alert and oriented   No motor  deficits.  pertinent DTRs normal    SKIN:   Skin normal color for race,   Warm and dry  No evidence of rash.    PSYCHIATRIC:   No apparent risk to self or others.        23 @ 07:01  -  23 @ 07:00  --------------------------------------------------------  IN:    dextrose 5% + sodium chloride 0.45%: 750 mL  Total IN: 750 mL    OUT:    Voided (mL): 500 mL  Total OUT: 500 mL    Total NET: 250 mL        LABS:                            14.5   10.93 )-----------( 215      ( 2023 06:22 )             43.8                                               02-    143  |  106  |  16  ----------------------------<  190<H>  3.7   |  26  |  1.1    Ca    9.1      2023 06:22    TPro  6.7  /  Alb  4.4  /  TBili  0.5  /  DBili  x   /  AST  21  /  ALT  22  /  AlkPhos  80  17      PT/INR - ( 2023 06:22 )   PT: 11.80 sec;   INR: 1.03 ratio         PTT - ( 2023 06:22 )  PTT:29.3 sec                                       Urinalysis Basic - ( 2023 09:00 )    Color: Yellow / Appearance: Clear / S.015 / pH: x  Gluc: x / Ketone: Negative  / Bili: Negative / Urobili: 0.2 mg/dL   Blood: x / Protein: Negative mg/dL / Nitrite: Negative   Leuk Esterase: Negative / RBC: x / WBC x   Sq Epi: x / Non Sq Epi: x / Bacteria: x        CARDIAC MARKERS ( 2023 06:22 )  x     / <0.01 ng/mL / x     / x     / x                                                LIVER FUNCTIONS - ( 2023 06:22 )  Alb: 4.4 g/dL / Pro: 6.7 g/dL / ALK PHOS: 80 U/L / ALT: 22 U/L / AST: 21 U/L / GGT: x                                                                        MEDICATIONS  (STANDING):  amitriptyline 10 milliGRAM(s) Oral at bedtime  atorvastatin 80 milliGRAM(s) Oral at bedtime  dextrose 5% + sodium chloride 0.45%. 1000 milliLiter(s) (75 mL/Hr) IV Continuous <Continuous>  finasteride 5 milliGRAM(s) Oral daily  latanoprost 0.005% Ophthalmic Solution 1 Drop(s) Both EYES at bedtime  pantoprazole    Tablet 40 milliGRAM(s) Oral before breakfast    MEDICATIONS  (PRN):    
Patient is a 77y old  Male who presents with a chief complaint of stroke (2023 14:50)        HPI:  77 y.o. 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 Pt states he woke up feeling well, went downstairs and when returning up the stairs  at about 5:10am  all of a sudden developed dizziness, nausea, diaphoresis and weakness. Pt states he was unable to walk due to symptoms  and fell.     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  I reviewed the radiology tests and hospital record.    I reviewed previous notes on this patient.    Interval Events: No overnight events.        REVIEW OF SYSTEMS:   see HPI      OBJECTIVE:  ICU Vital Signs Last 24 Hrs  T(C): 36.6 (2023 03:02), Max: 37.1 (2023 17:00)  T(F): 97.8 (2023 03:02), Max: 98.8 (2023 17:00)  HR: 63 (2023 05:00) (61 - 93)  BP: 130/63 (2023 05:00) (112/55 - 186/86)  BP(mean): 90 (2023 05:00) (78 - 119)  ABP: --  ABP(mean): --  RR: 20 (2023 05:00) (16 - 27)  SpO2: 97% (2023 05:00) (95% - 100%)    O2 Parameters below as of 2023 19:08  Patient On (Oxygen Delivery Method): room air              - @ 07:01  -  02-18 @ 05:45  --------------------------------------------------------  IN: 600 mL / OUT: 500 mL / NET: 100 mL      CAPILLARY BLOOD GLUCOSE  149 (2023 06:20)            PHYSICAL EXAM:       · ENMT:   Airway patent,   Nasal mucosa clear.  Mouth with normal mucosa.   No thrush    · EYES:   Clear bilaterally,   pupils equal,   round and reactive to light.    · CARDIAC:   Normal rate,   regular rhythm.    Heart sounds S1, S2.   No murmurs, no rubs or gallops on auscultation  no edema        CAROTID:   normal systolic impulse  no bruits    · RESPIRATORY:   rales  normal chest expansion  no retractions or use of accessory muscles  percussion of chest demonstrates no hyperresonance or dullness    · GASTROINTESTINAL:  Abdomen soft,   non-tender,   + BS  liver/spleen not palpable    · MUSCULOSKELETAL:   no clubbing, cyanosis      · SKIN:   Skin normal color for race,   warm, dry   No evidence of rash.        · HEME LYMPH:   no splenomegaly.  No cervical  lymphadenopathy.  no inguinal lymphadenopathy    HOSPITAL MEDICATIONS:  MEDICATIONS  (STANDING):  amitriptyline 10 milliGRAM(s) Oral at bedtime  atorvastatin 80 milliGRAM(s) Oral at bedtime  dextrose 5% + sodium chloride 0.45%. 1000 milliLiter(s) (75 mL/Hr) IV Continuous <Continuous>  finasteride 5 milliGRAM(s) Oral daily  latanoprost 0.005% Ophthalmic Solution 1 Drop(s) Both EYES at bedtime  pantoprazole    Tablet 40 milliGRAM(s) Oral before breakfast    MEDICATIONS  (PRN):    dextrose 5% + sodium chloride 0.45%.: Solution, 1000 milliLiter(s) infuse at 75 mL/Hr  Provider's Contact #: [phone]      LABS:                        14.5   10.93 )-----------( 215      ( 2023 06:22 )             43.8         143  |  106  |  16  ----------------------------<  190<H>  3.7   |  26  |  1.1    Ca    9.1      2023 06:22    TPro  6.7  /  Alb  4.4  /  TBili  0.5  /  DBili  x   /  AST  21  /  ALT  22  /  AlkPhos  80  02-    PT/INR - ( 2023 06:22 )   PT: 11.80 sec;   INR: 1.03 ratio         PTT - ( 2023 06:22 )  PTT:29.3 sec  Urinalysis Basic - ( 2023 09:00 )    Color: Yellow / Appearance: Clear / S.015 / pH: x  Gluc: x / Ketone: Negative  / Bili: Negative / Urobili: 0.2 mg/dL   Blood: x / Protein: Negative mg/dL / Nitrite: Negative   Leuk Esterase: Negative / RBC: x / WBC x   Sq Epi: x / Non Sq Epi: x / Bacteria: x            CARDIAC MARKERS ( 2023 06:22 )  x     / <0.01 ng/mL / x     / x     / x          COVID-19 PCR: NotDetec (2023 07:40)            RADIOLOGY: Today I personally interpreted the latest CXR and other pertinent films.

## 2023-02-18 NOTE — PHYSICAL THERAPY INITIAL EVALUATION ADULT - NSACTIVITYREC_GEN_A_PT
Pt is independent with Bed mobility ,transfer ,  Supervision W/ ambulation and Stair negotiation W/o AD and W/ good static/ dynamic balance , Skill PT not recommended at this time ,Reconsult Skill PT in the future as appropriate.

## 2023-02-18 NOTE — DISCHARGE NOTE PROVIDER - CARE PROVIDER_API CALL
Raymundo Lau)  Neurology  [address] [place] [address]  Phone: [phone]  Fax: [phone]  Follow Up Time: 2 weeks    Alvarado Mancia)  Cardiac Electrophysiology; Cardiovascular Disease; [facility]  [address] [place] [address]  Phone: [phone]  Fax: [phone]  Follow Up Time: 2 weeks    Sally Nicholson)  Neuroradiology; Radiology  [address] [place] [address]  Phone: [phone]  Fax: [phone]  Follow Up Time: 1 week

## 2023-02-18 NOTE — DISCHARGE NOTE PROVIDER - PROVIDER TOKENS
PROVIDER:[TOKEN:[[id number]],FOLLOWUP:[2 weeks]],PROVIDER:[TOKEN:[[id number]],FOLLOWUP:[2 weeks]],PROVIDER:[TOKEN:[[id number]],FOLLOWUP:[1 week]]

## 2023-02-18 NOTE — OCCUPATIONAL THERAPY INITIAL EVALUATION ADULT - LIVES WITH, PROFILE
wife and daughter in a private house no steps to enter 2 + 10 steps to get to living area with 1 hand rail; +bathtub and +walk in shower/children/spouse

## 2023-02-18 NOTE — DISCHARGE NOTE PROVIDER - NSDCMRMEDTOKEN_GEN_ALL_CORE_FT
amitriptyline 10 mg oral tablet: 1 tab(s) orally once a day (at bedtime)  amLODIPine 10 mg oral tablet: 1 tab(s) orally once a day  aspirin 81 mg oral tablet: 1 tab(s) orally once a day  atorvastatin 80 mg oral tablet: 1 tab(s) orally once a day (at bedtime)  clopidogrel 75 mg oral tablet: 1 tab(s) orally once a day  finasteride 5 mg oral tablet: 1 tab(s) orally once a day  latanoprost 0.005% ophthalmic solution: 2 drop(s) to each affected eye once a day (in the evening)  omeprazole 40 mg oral delayed release capsule: 1 cap(s) orally once a day

## 2023-02-18 NOTE — PHYSICAL THERAPY INITIAL EVALUATION ADULT - GENERAL OBSERVATIONS, REHAB EVAL
11:35 -12:00 Chart reviewed. Order received.  Patient is ok to be  seen for PT,confirmed with RN. pt encountered  Semi gibbs in the bed denies pain, and agrees to participate in session, +  Heplock , +  Tele  ,NAD.

## 2023-02-18 NOTE — CONSULT NOTE ADULT - ASSESSMENT
IMPRESSION: Rehab of 76 y/o m  rehab  for  TIA     PRECAUTIONS: [  ] Cardiac  [  ] Respiratory  [  ] Seizures [  ] Contact Isolation  [  ] Droplet Isolation  [ FALL ] Other    Weight Bearing Status:     RECOMMENDATION:    Out of Bed to Chair     DVT/Decubiti Prophylaxis    REHAB PLAN:     [ x  ] Bedside P/T 3-5 times a week   [   ]   Bedside O/T  2-3 times a week             [   ] No Rehab Therapy Indicated                   [   ]  Speech Therapy   Conditioning/ROM                                    ADL  Bed Mobility                                               Conditioning/ROM  Transfers                                                     Bed Mobility  Sitting /Standing Balance                         Transfers                                        Gait Training                                               Sitting/Standing Balance  Stair Training [   ]Applicable                    Home equipment Eval                                                                        Splinting  [   ] Only      GOALS:   ADL   [   ]   Independent                    Transfers  [   ] Independent                          Ambulation  [   ] Independent     [    ] With device                            [   ]  CG                                                         [   ]  CG                                                                  [   ] CG                            [    ] Min A                                                   [   ] Min A                                                              [   ] Min  A          DISCHARGE PLAN:   [   ]  Good candidate for Intensive Rehabilitation/Hospital based-4A [facility]                                             Will tolerate 3hrs Intensive Rehab Daily                                       [    ]  Short Term Rehab in Skilled Nursing Facility                                       [ xx   ]  Home with Outpatient or VN services d/w  ptn and  family  rehab  plan cont  [facility]  brain mri                                         [    ]  Possible Candidate for Intensive Hospital based Rehab

## 2023-02-18 NOTE — DISCHARGE NOTE PROVIDER - NSDCCPCAREPLAN_GEN_ALL_CORE_FT
PRINCIPAL DISCHARGE DIAGNOSIS  Diagnosis: CVA (cerebral vascular accident)  Assessment and Plan of Treatment: Call [facility] stroke clinic - [phone] to schedule follow up within 2 weeks, Call [phone] to schedule follow up with the electrophysiologist, Call [phone] ([facility] radiology) to schedule an outpatient MRI.   You have been diagnosed with stroke which is a condition that develops when part of the brain doesn't get enough blood and oxygen   - Take your blood thinners and cholesterol medication as prescribed. Do not skip doses and do not run low on your medication. call your doctor if you need refills   - If you have diabetes, take your diabetes medication as prescribed. Check your blood sugar level every day and contact your doctor if the levels arr high as your medication may need to be re-adjusted or some medication may need to be added   - If you have hypertension, take your blood pressure medication as prescribed. Measure your blood pressure with a cuff every day and write down the values. Call your doctors if the values are high despite medication as he may adjust your medication   - Avoid smoking and do not let anyone smoke next to you. If you are a smoker and find it hard to smoke seek help or call your doctors for referrals for counselling programs   - Follow up with your doctor as outpatient. he may prescribe regular lab work to keep track of your cholesterol and sugar levels in your blood. Do not skip appointments and always be compliant wit your doctor's advise  - Call [phone] or report to the emergency department if you have sudden onset severe headache, vision problems such as blurry or double vision or vision that is going dark, vertigo, numbness or weakness anywhere in your body, slurred speech or difficulty walking

## 2023-02-18 NOTE — DISCHARGE NOTE NURSING/CASE MANAGEMENT/SOCIAL WORK - NSDCFUADDAPPT_GEN_ALL_CORE_FT
Call [facility] stroke clinic - [phone] to schedule follow up within 2 weeks, Call [phone] to schedule follow up with the electrophysiologist, Call [phone] ([facility] radiology) to schedule an outpatient MRI

## 2023-02-18 NOTE — PROGRESS NOTE ADULT - ATTENDING SUPERVISION STATEMENT
Resident Take your medications exactly as prescribed. Having your pain under control will help increase activity, improve deep breathing and coughing and prevent complications like pneumonia and blood clots in your legs. Some of the common side effects of pain medications are nausea, vomiting, itching, rash and upset stomach. Contact your doctor if you develop these or any other unusual systoms. Eat a diet rich in fiber and drink plenty of oral fluids. Use other pain management methods like, cold and warm applications, elevation of affected body part, listening to music, watching TV, yoga, etc.Do not take any other medications unless approved by your doctor. Do not drive, operate machinery, drink alcohol, or make any important decisions while taking narcotic pain medications. Store medication in its original bottle, in a locked cabinet away from the reach of children. Dispose of any unused and  medication safely.

## 2023-02-18 NOTE — DISCHARGE NOTE PROVIDER - HOSPITAL COURSE
77 y.o. 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 CTP showed perfusion deficit on left cerebellum CTA head and neck showed occlusion of the left vertebral artery. He received TNK on 2/18. Repeat CT head after 24 hours showed no mass effect or hemorrhage but showed  new patchy hypoattenuation within the left cerebellar hemisphere. TTE showed normal LA.   Discussed with patient and family in details the necessity of staying in the hospital for Brain MRI and EP consult for MCOT and loop recorder but patient wants to leave home and get all the work up in outpatient setting. Risk of leaving AMA discussed with patient but he stated that he made up his mind and would like to signout AMA.   - Start ASA 81 and Plavix 75 mg   - Continue Lipitor 80   - Resume home blood pressure medication  - Outpatient follow up with stroke clinic in 1 week   - Outpatient follow up with EP for ILR   - Outpatient MRI brain    Patient given contact for EP, Stroke clinic and Radiology.     *** Patient left Against Medical Advice ***

## 2023-02-22 DIAGNOSIS — Z79.82 LONG TERM (CURRENT) USE OF ASPIRIN: ICD-10-CM

## 2023-02-22 DIAGNOSIS — Z79.899 OTHER LONG TERM (CURRENT) DRUG THERAPY: ICD-10-CM

## 2023-02-22 DIAGNOSIS — E78.5 HYPERLIPIDEMIA, UNSPECIFIED: ICD-10-CM

## 2023-02-22 DIAGNOSIS — I10 ESSENTIAL (PRIMARY) HYPERTENSION: ICD-10-CM

## 2023-02-22 DIAGNOSIS — I63.59 CEREBRAL INFARCTION DUE TO UNSPECIFIED OCCLUSION OR STENOSIS OF OTHER CEREBRAL ARTERY: ICD-10-CM

## 2023-02-22 DIAGNOSIS — R29.700 NIHSS SCORE 0: ICD-10-CM

## 2023-02-22 DIAGNOSIS — I69.398 OTHER SEQUELAE OF CEREBRAL INFARCTION: ICD-10-CM

## 2023-02-22 DIAGNOSIS — Z20.822 CONTACT WITH AND (SUSPECTED) EXPOSURE TO COVID-19: ICD-10-CM

## 2023-02-22 DIAGNOSIS — H54.52A1 LOW VISION LEFT EYE CATEGORY 1, NORMAL VISION RIGHT EYE: ICD-10-CM

## 2023-02-22 DIAGNOSIS — N40.0 BENIGN PROSTATIC HYPERPLASIA WITHOUT LOWER URINARY TRACT SYMPTOMS: ICD-10-CM

## 2023-02-22 DIAGNOSIS — Z66 DO NOT RESUSCITATE: ICD-10-CM

## 2023-02-23 PROBLEM — K57.92 DIVERTICULITIS OF INTESTINE, PART UNSPECIFIED, WITHOUT PERFORATION OR ABSCESS WITHOUT BLEEDING: Chronic | Status: ACTIVE | Noted: 2023-02-17

## 2023-02-23 PROBLEM — I63.9 CEREBRAL INFARCTION, UNSPECIFIED: Chronic | Status: ACTIVE | Noted: 2023-02-17

## 2023-03-03 LAB — GLUCOSE BLDC GLUCOMTR-MCNC: 149 MG/DL — HIGH (ref 70–99)

## 2023-03-08 ENCOUNTER — OUTPATIENT (OUTPATIENT)
Dept: OUTPATIENT SERVICES | Facility: HOSPITAL | Age: 78
LOS: 1 days | End: 2023-03-08
Payer: MEDICARE

## 2023-03-08 DIAGNOSIS — R51.9 HEADACHE, UNSPECIFIED: ICD-10-CM

## 2023-03-08 DIAGNOSIS — Z00.8 ENCOUNTER FOR OTHER GENERAL EXAMINATION: ICD-10-CM

## 2023-03-08 PROCEDURE — 70547 MR ANGIOGRAPHY NECK W/O DYE: CPT | Mod: 26

## 2023-03-08 PROCEDURE — 70544 MR ANGIOGRAPHY HEAD W/O DYE: CPT | Mod: 26

## 2023-03-08 PROCEDURE — 70544 MR ANGIOGRAPHY HEAD W/O DYE: CPT

## 2023-03-08 PROCEDURE — 70547 MR ANGIOGRAPHY NECK W/O DYE: CPT

## 2023-03-09 DIAGNOSIS — R51.9 HEADACHE, UNSPECIFIED: ICD-10-CM

## 2023-03-21 ENCOUNTER — APPOINTMENT (OUTPATIENT)
Dept: NEUROLOGY | Facility: CLINIC | Age: 78
End: 2023-03-21
Payer: MEDICARE

## 2023-03-21 ENCOUNTER — NON-APPOINTMENT (OUTPATIENT)
Age: 78
End: 2023-03-21

## 2023-03-21 VITALS
BODY MASS INDEX: 27.28 KG/M2 | OXYGEN SATURATION: 99 % | HEART RATE: 64 BPM | TEMPERATURE: 98.4 F | DIASTOLIC BLOOD PRESSURE: 80 MMHG | HEIGHT: 68 IN | SYSTOLIC BLOOD PRESSURE: 149 MMHG | WEIGHT: 180 LBS

## 2023-03-21 DIAGNOSIS — I63.9 CEREBRAL INFARCTION, UNSPECIFIED: ICD-10-CM

## 2023-03-21 PROCEDURE — 99215 OFFICE O/P EST HI 40 MIN: CPT

## 2023-03-21 RX ORDER — FINASTERIDE 5 MG/1
5 TABLET, FILM COATED ORAL DAILY
Refills: 0 | Status: ACTIVE | COMMUNITY

## 2023-03-21 RX ORDER — CLOPIDOGREL 75 MG/1
75 TABLET, FILM COATED ORAL DAILY
Refills: 0 | Status: ACTIVE | COMMUNITY

## 2023-03-21 RX ORDER — ASPIRIN ENTERIC COATED TABLETS 81 MG 81 MG/1
81 TABLET, DELAYED RELEASE ORAL DAILY
Refills: 0 | Status: ACTIVE | COMMUNITY

## 2023-03-21 RX ORDER — AMLODIPINE BESYLATE 10 MG/1
10 TABLET ORAL DAILY
Refills: 0 | Status: ACTIVE | COMMUNITY

## 2023-03-21 RX ORDER — FAMOTIDINE 40 MG/1
40 TABLET, FILM COATED ORAL DAILY
Refills: 0 | Status: ACTIVE | COMMUNITY

## 2023-03-21 RX ORDER — ATORVASTATIN CALCIUM 40 MG/1
40 TABLET, FILM COATED ORAL DAILY
Refills: 0 | Status: ACTIVE | COMMUNITY

## 2023-03-21 RX ORDER — LISINOPRIL 2.5 MG/1
2.5 TABLET ORAL DAILY
Refills: 0 | Status: ACTIVE | COMMUNITY

## 2023-03-29 NOTE — PHYSICAL EXAM
[FreeTextEntry1] : Focal neurological exam:\par \par MS: Awake, alert, oriented to person, place, situation and time. Normal affect. Follows commands. \par \par Language: Speech is clear, fluent with good repetition & comprehension. No dysarthria. \par \par CNs 2 - 12 intact. EOMI no nystagmus, no diplopia. CHRONIC L EYE MONOCULAR VISION LOSS. No facial asymmetry b/l, full eye closure strength b/l. Hearing grossly normal. Head turning & shoulder shrug intact b/l. Tongue midline, normal movements, no atrophy.\par \par Motor: Normal muscle bulk & tone. No noticeable tremor or seizure. No pronator drift. Muscle strength of b/l UE and b/l LE 5/5. \par \par Reflexes: DTR of biceps, knee and ankle normal \par \par Sensation: Intact to LT, temperature and vibration b/l throughout\par \par Cortical: No extinction\par \par Coordination: No dysmetria to FTN.\par \par Gait: subtle instability \par \par NIHSS w (chronic vision loss)\par \par \par \par \par

## 2023-03-29 NOTE — HISTORY OF PRESENT ILLNESS
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 s/p TNK. CTP w/ L cerebellar hypoperfusion. 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 He left AMA from the hospital and did not complete recommended MRI H or EP c/s.  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 \par -Short segment of mild stenosis involving the proximal the V4 segment of the right vertebral artery and a short segment of severe stenosis just proximal to the basilar artery. \par \par MRA H/N 3/2023 ordered by PCP reported stable compared to CTA 2/17/2023 \par \par PTA: statin and ASA 81 QD \par Admits to palpitations at times/?GERD \par Says at home BP slightly over 140/80\par \par Triage /74\par Says w/ mild L sided drifting at times \par LDL 44, A1c 6.4 \par Says back to working out at gym\par \par \par \par

## 2023-03-29 NOTE — ASSESSMENT
Patient requesting a medication refill.     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 s/p TNK. CTP w/ L cerebellar hypoperfusion. 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 He left AMA before full w/u was completed. 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 Today, NIHSS 2 for chronic monocular vision loss. \par \par PLAN:\par -C/w ASA/Plavix x90 days. Will double ck w/ Dr. Escamilla \par -Please notify our office if experiencing spontaneous bleeding or bruising\par -EPS referral for 30 day MCOT/ILR \par -C/w Lipitor 40\par -He deferred OT for gait \par -MRI H -- Stand Up okay bc back pain when lying down\par -F/u Dr. Escamilla in 2 mo when 90 day DAPT is completed

## 2023-03-29 NOTE — ADDENDUM
[FreeTextEntry1] : Spoken to Dr. Escamilla. She agrees that DAPT x90 days is reasonable. That's what I'll do. She also recommended ILR if patient is amenable and I just spoke to him 17:22 , 03/21/2023, and he's not at this time. He agrees to DAPT x90 days \par \par Patient verbalized understanding at 17:22 , 03/29/2023\par \par \par

## 2023-04-25 DIAGNOSIS — I66.9 OCCLUSION AND STENOSIS OF UNSPECIFIED CEREBRAL ARTERY: ICD-10-CM

## 2023-09-07 NOTE — PROGRESS NOTE ADULT - ATTENDING COMMENTS
Here for left eye injury. Stated was playing basketball and got elbow to his left eye sustaining a laceration to the eyelid of his left eye. Bleeding controlled. Also c/o dizziness, nausea, and blurry vision to left eye. ABCs intact.      Triage Assessment       Row Name 09/06/23 1950       Triage Assessment (Adult)    Airway WDL WDL       Respiratory WDL    Respiratory WDL WDL       Peripheral/Neurovascular WDL    Peripheral Neurovascular WDL WDL                    
77 y.o. 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 Pt states he woke up feeling well, went downstairs and when returning up the stairs  at about 5:10am  all of a sudden developed dizziness, nausea, diaphoresis and weakness. Pt states he was unable to walk due to symptoms  and fell.   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 Physiatry PT/OT eval.   - Code Status: DNR/DNI    Dr. Shashi Noyola  Attending Hospitalist

## 2024-01-09 ENCOUNTER — APPOINTMENT (OUTPATIENT)
Dept: NEUROLOGY | Facility: CLINIC | Age: 79
End: 2024-01-09

## 2024-02-03 ENCOUNTER — OUTPATIENT (OUTPATIENT)
Dept: OUTPATIENT SERVICES | Facility: HOSPITAL | Age: 79
LOS: 1 days | End: 2024-02-03
Payer: MEDICARE

## 2024-02-03 DIAGNOSIS — R51.9 HEADACHE, UNSPECIFIED: ICD-10-CM

## 2024-02-03 DIAGNOSIS — Z00.8 ENCOUNTER FOR OTHER GENERAL EXAMINATION: ICD-10-CM

## 2024-02-03 PROCEDURE — 70551 MRI BRAIN STEM W/O DYE: CPT | Mod: 26

## 2024-02-03 PROCEDURE — 70547 MR ANGIOGRAPHY NECK W/O DYE: CPT | Mod: 26

## 2024-02-03 PROCEDURE — 70551 MRI BRAIN STEM W/O DYE: CPT

## 2024-02-03 PROCEDURE — 70544 MR ANGIOGRAPHY HEAD W/O DYE: CPT | Mod: 26,XU

## 2024-02-03 PROCEDURE — 70547 MR ANGIOGRAPHY NECK W/O DYE: CPT

## 2024-02-03 PROCEDURE — 70544 MR ANGIOGRAPHY HEAD W/O DYE: CPT

## 2024-02-04 DIAGNOSIS — R51.9 HEADACHE, UNSPECIFIED: ICD-10-CM

## 2024-04-05 NOTE — ED ADULT TRIAGE NOTE - TEMPERATURE IN FAHRENHEIT (DEGREES F)
- Patient with large Melanotic Episodes on 3/27 w/ significant drop in H+H  - Was Transfused 3 Units of PRBCs on 3/27  - EGD 3/27: Technically Limited Study, + Duodenal Erosions but no evidence of Active Bleed    - Remains on Protonix 40 mg q 12 hrs IVP and Sucralfate q 6    - If patient develops decreasing H+H or HD instability will need CTA of abdomen  - Continue to monitor CBC   - Patient is Baptism However Family is in agreement for administration of blood products ( Per Wife) 97

## 2024-06-11 ENCOUNTER — APPOINTMENT (OUTPATIENT)
Dept: ORTHOPEDIC SURGERY | Facility: CLINIC | Age: 79
End: 2024-06-11
Payer: MEDICARE

## 2024-06-11 DIAGNOSIS — M17.12 UNILATERAL PRIMARY OSTEOARTHRITIS, LEFT KNEE: ICD-10-CM

## 2024-06-11 PROCEDURE — 99203 OFFICE O/P NEW LOW 30 MIN: CPT

## 2024-06-11 NOTE — HISTORY OF PRESENT ILLNESS
[de-identified] : 78-year-old gentleman who likes to exercise presents his office for evaluation of left knee pain for the last several months.  Pain occurred around the time that he stopped going to the gym regularly.  In the past is an avid weightlifter he would do leg presses up to 250 pounds but has not been doing this since his knee pain started.  Knee pain is mostly anteriorly although originally he also had some posterior knee pain.  He walks without a cane or assistive device.  Unable to take NSAIDs he finds tumeric particularly helpful and probably more helpful than the Tylenol arthritis that his primary medical doctor recommended.  Past medical history:  Remote history of strokes Hypertension Hyperlipidemia GERD  Medications: Amlodipine famotidine atorvastatin Plavix finasteride  NKDA  Social: , lives with wife, remote history of cigarette smoking, rare social EtOH, retired  for [employer]

## 2024-06-11 NOTE — ASSESSMENT
[FreeTextEntry1] : 78-year-old gentleman with complaints of knee pain with stairs.  Benign physical examination.  Relatively unremarkable radiographic examination.  I suspect much of his discomfort is related to deconditioning having stopped going to the gym.  I think he would benefit from physical therapy which would focus on teaching him a self-directed exercise program that will help his knee strength and thereby help his knee pain.  He can try topical over-the-counter NSAIDs but would run this by his primary medical doctor first.  This medications: Voltaren 1%.  Will see him back in 3 months time after he has tried the therapy.  If he still having pain we can give him a hyaluronic acid injection.  All questions answered his wife satisfaction.

## 2024-06-11 NOTE — IMAGING
[de-identified] : Pleasant middle-age gentleman sits reasonably comfortably in my office.  He looks a lot younger than stated age.  He is accompanied by his wife.  Physical examination: Left knee: No evidence of effusion or synovial thickening.  No joint line tenderness.  No varus valgus instability.  Patellofemoral femoral tracking is normal.  No J sign or patella tilt.  Negative Lachman test.  No geniculate lymph nodes or masses.  Calf soft no cords.  Knee motion full is (0-140 degrees) without any evidence of pain.  Gait normal with no antalgia or limp.  Radiographs reviewed from outside institution of April 23, 2024 demonstrate minimal patellofemoral joint space narrowing and medial compartment knee joint space narrowing with no marginal osteophytes or subchondral sclerotic changes.  No cystic changes.  No para-articular erosive changes.

## 2024-09-24 ENCOUNTER — APPOINTMENT (OUTPATIENT)
Dept: ORTHOPEDIC SURGERY | Facility: CLINIC | Age: 79
End: 2024-09-24
Payer: MEDICARE

## 2024-09-24 DIAGNOSIS — M17.12 UNILATERAL PRIMARY OSTEOARTHRITIS, LEFT KNEE: ICD-10-CM

## 2024-09-24 PROCEDURE — 99213 OFFICE O/P EST LOW 20 MIN: CPT

## 2024-09-24 RX ORDER — DICLOFENAC SODIUM 3 G/100G
3 GEL TOPICAL TWICE DAILY
Qty: 1 | Refills: 2 | Status: ACTIVE | COMMUNITY
Start: 2024-09-24 | End: 1900-01-01

## 2024-09-24 NOTE — ASSESSMENT
[FreeTextEntry1] : 78-year-old gentleman with some mild degenerative changes about his left knee.  Certainly we can proceed with a hyaluronic acid injection which may improve his symptoms.  We talked about the injection.  In addition we can also probably give him a prescription for topical diclofenac as he had a positive response to the 1% version.  He can run this by his neurologist make sure that it is okay with his history of minor strokes.  He is not on any significant anticoagulation except for some Plavix.  He is encouraged to continue his exercise.  Will have him follow-up after the injection in 6 months if he needs we can give him another injection at that time.  Procedure: With the patient's consent and at his request utilizing standard sterile technique he was provided a hyaluronic acid injection into his left knee through a superior lateral portal.  Patient injection without issue.  Postinjection precautions were discussed.

## 2024-09-24 NOTE — HISTORY OF PRESENT ILLNESS
[de-identified] : 78-year-old gentleman returns for interval follow-up of left knee pain.  Prior radiographs were relatively unremarkable.  Working diagnosis was overuse phenomenon and probably a degenerative medial meniscal tear.  Since I last saw him he has been taking Tylenol.  He tries to avoid NSAIDs because he is on Plavix because of history of minor stroke.  He has also tried over-the-counter 1% topical Voltaren cream which he found helpful.  He continues to go to the gym regularly.  He walks with a cane or assistive device.  Does not utilize any braces on his knee.  He is accompanied by his wife in the exam room.

## 2024-09-24 NOTE — IMAGING
[de-identified] : Suni older gentleman looks younger than stated age sits comfortably my office no distress.  Was able to get onto the exam table without assistance.  Physical examination: Left knee: Relatively unremarkable examination.  Minimally abnormal patellofemoral grind test.  Apley's is somewhat abnormal he localizes pain to the anterior aspect of his knee.  He is some mild mid medial joint line tenderness.  No lateral joint line tenderness.  Minimal synovial thickening.  No effusion.  Knee motion 0-115 degrees.

## 2025-02-22 ENCOUNTER — OUTPATIENT (OUTPATIENT)
Dept: OUTPATIENT SERVICES | Facility: HOSPITAL | Age: 80
LOS: 1 days | End: 2025-02-22
Payer: MEDICARE

## 2025-02-22 DIAGNOSIS — Z00.8 ENCOUNTER FOR OTHER GENERAL EXAMINATION: ICD-10-CM

## 2025-02-22 DIAGNOSIS — I66.9 OCCLUSION AND STENOSIS OF UNSPECIFIED CEREBRAL ARTERY: ICD-10-CM

## 2025-02-22 PROCEDURE — 70544 MR ANGIOGRAPHY HEAD W/O DYE: CPT

## 2025-02-22 PROCEDURE — 70551 MRI BRAIN STEM W/O DYE: CPT

## 2025-02-22 PROCEDURE — 70551 MRI BRAIN STEM W/O DYE: CPT | Mod: 26

## 2025-02-22 PROCEDURE — 70544 MR ANGIOGRAPHY HEAD W/O DYE: CPT | Mod: 26,XU

## 2025-02-23 DIAGNOSIS — I66.9 OCCLUSION AND STENOSIS OF UNSPECIFIED CEREBRAL ARTERY: ICD-10-CM

## 2025-03-04 ENCOUNTER — APPOINTMENT (OUTPATIENT)
Dept: ORTHOPEDIC SURGERY | Facility: CLINIC | Age: 80
End: 2025-03-04